# Patient Record
Sex: FEMALE | Race: WHITE | Employment: FULL TIME
[De-identification: names, ages, dates, MRNs, and addresses within clinical notes are randomized per-mention and may not be internally consistent; named-entity substitution may affect disease eponyms.]

---

## 2018-04-04 ENCOUNTER — HOSPITAL ENCOUNTER (OUTPATIENT)
Dept: MAMMOGRAPHY | Facility: CLINIC | Age: 63
Discharge: HOME | End: 2018-04-06
Payer: COMMERCIAL

## 2018-04-04 DIAGNOSIS — Z12.39 SCREENING BREAST EXAMINATION: ICD-10-CM

## 2018-04-04 PROCEDURE — 77067 SCR MAMMO BI INCL CAD: CPT

## 2018-11-26 ENCOUNTER — HOSPITAL ENCOUNTER (OUTPATIENT)
Age: 63
Discharge: HOME | End: 2018-11-26
Payer: COMMERCIAL

## 2018-11-26 LAB
ALT SERPL-CCNC: 25 U/L (ref 5–33)
ANION GAP SERPL CALCULATED.3IONS-SCNC: 11 MMOL/L (ref 9–17)
AST SERPL-CCNC: 27 U/L
BUN BLDV-MCNC: 17 MG/DL (ref 8–23)
BUN/CREAT BLD: NORMAL (ref 9–20)
CALCIUM SERPL-MCNC: 9.3 MG/DL (ref 8.6–10.4)
CHLORIDE BLD-SCNC: 102 MMOL/L (ref 98–107)
CHOLESTEROL/HDL RATIO: 3.7
CHOLESTEROL: 158 MG/DL
CO2: 28 MMOL/L (ref 20–31)
CREAT SERPL-MCNC: 0.54 MG/DL (ref 0.5–0.9)
GFR AFRICAN AMERICAN: >60 ML/MIN
GFR NON-AFRICAN AMERICAN: >60 ML/MIN
GFR SERPL CREATININE-BSD FRML MDRD: NORMAL ML/MIN/{1.73_M2}
GFR SERPL CREATININE-BSD FRML MDRD: NORMAL ML/MIN/{1.73_M2}
GLUCOSE BLD-MCNC: 96 MG/DL (ref 70–99)
HDLC SERPL-MCNC: 43 MG/DL
LDL CHOLESTEROL: 104 MG/DL (ref 0–130)
POTASSIUM SERPL-SCNC: 3.9 MMOL/L (ref 3.7–5.3)
SODIUM BLD-SCNC: 141 MMOL/L (ref 135–144)
TRIGL SERPL-MCNC: 56 MG/DL
VLDLC SERPL CALC-MCNC: NORMAL MG/DL (ref 1–30)

## 2018-11-26 PROCEDURE — 80048 BASIC METABOLIC PNL TOTAL CA: CPT

## 2018-11-26 PROCEDURE — 80061 LIPID PANEL: CPT

## 2018-11-26 PROCEDURE — 84460 ALANINE AMINO (ALT) (SGPT): CPT

## 2018-11-26 PROCEDURE — 84450 TRANSFERASE (AST) (SGOT): CPT

## 2018-11-26 PROCEDURE — 36415 COLL VENOUS BLD VENIPUNCTURE: CPT

## 2020-08-25 ENCOUNTER — HOSPITAL ENCOUNTER (OUTPATIENT)
Facility: CLINIC | Age: 65
Discharge: HOME | End: 2020-08-27
Payer: COMMERCIAL

## 2020-08-25 ENCOUNTER — HOSPITAL ENCOUNTER (OUTPATIENT)
Dept: GENERAL RADIOLOGY | Facility: CLINIC | Age: 65
Discharge: HOME | End: 2020-08-27
Payer: COMMERCIAL

## 2020-08-25 PROCEDURE — 72040 X-RAY EXAM NECK SPINE 2-3 VW: CPT

## 2020-09-01 ENCOUNTER — HOSPITAL ENCOUNTER (OUTPATIENT)
Dept: PHYSICAL THERAPY | Age: 65
Setting detail: THERAPIES SERIES
Discharge: HOME | End: 2020-09-01
Payer: COMMERCIAL

## 2020-09-01 PROCEDURE — 97161 PT EVAL LOW COMPLEX 20 MIN: CPT

## 2020-09-01 PROCEDURE — 97140 MANUAL THERAPY 1/> REGIONS: CPT

## 2020-09-01 NOTE — CONSULTS
Cane [] Quad cane [] Straight Cane [] Quad cane    [] Standard walker [] Rolling walker   [] 4 wheeled walker [] Standard walker [] Rolling walker   [] 4 wheeled walker    [] Wheelchair [] Wheelchair     Pain:  [x] Yes  [] No Location: Neck up to head   Pain Rating: (0-10 scale) 3-5/10  Pain altered Tx:  [] Yes  [x] No  Action:    Symptoms:  [] Improving [] Worsening [] Same  Better:  [] AM    [] PM    [] Sit    [] Rise/Sit    []Stand    [] Walk    [] Lying    [x] Other: hot shower, ibuprofen  Worse: [] AM    [] PM    [] Sit    [] Rise/Sit    []Stand    [] Walk    [] Lying    [] Bend                      [] Valsalva    [x] Other: random things  Sleep: [] OK    [x] Disturbed    Objective:      STRENGTH  ROM    Left Right Cervical    C5 Shld Abd 4 4 Flexion 47   Shld Flexion 4 4 Extension 32   Shld IR 4 4 Rotation L 37 R 47   Shld ER 4 4 Sidebend L 25 R 20   C6 Elb Flex 4 4 Retraction    C7 Elb Ext 4 4 Lumbar    C8 EPL   Flexion    T1 Fing Abd   Extension       Rotation L  R   Shoulder extension 4- 4- Sidebend L R   Scapular retraction 4- 4- UE/LE        B UE L WFL R WFL     TESTS (+/-) LEFT RIGHT Not Tested   Cerv. Comp - - []   Cerv. Distraction - - []   Jagdeep Tests ? Pain ?  Pain No Change Not Tested   Rep Prot [] [] [x] []   Rep Retract [] [] [x] []     OBSERVATION No Deficit Deficit Not Tested Comments   Posture       Forward Head [] [x] []    Rounded Shoulders [] [x] []    Kyphosis [] [x] [] Slight   Lordosis [x] [] []    Lateral Shift [] [] [x]    Scoliosis [] [] [x]    Iliac Crest [] [] [x]    PSIS [] [] [x]    ASIS [] [] [x]    Genu Valgus [] [] [x]    Genu Varus [] [] [x]    Genu Recurvatum [] [] [x]    Pronation [] [] [x]    Supination [] [] [x]    Leg Length Discrp [] [] [x]    Slumped Sitting [] [x] []    Palpation [] [x] [] Muscle spasms throughout bilateral upper trap, cervical paraspinals, thoracic paraspinals, and periscapular areas bilaterally   Sensation [x] [] []    Edema [x] [] [] Neurological [x] [] []      Functional Test: NDI Score: 26% functionally impaired     Comments:    Assessment:  Patient would benefit from skilled physical therapy services in order to: improve posture, decrease spasms, decrease neck pain, improve bilateral upper extremity strength, improve flexibility of anterior chest musculature. Problems:    [x] ? Pain:  [x] ? ROM:  [x] ? Strength:  [x] ? Function:  [x] Other:  Goals to not be woken up at night, and better motion while driving. STG: (to be met in 6 treatments)  1. ? Pain: Neck pain improve to 3/10 at max with all activities  2. Patient to be more aware of posture and report improved sitting/standing while doing work-related computer tasks  3. ? ROM: Cervical extension improve to 40 degrees, left rotation improve to 45 degrees  4. ? Strength: Posterior musculature improve to 4/5  5. ? Function: Patient to report better range in her cervical spine while driving  6. Patient to report no pain in clavicle when donning seat belt. 7. Patient to be independent with home exercise program as demonstrated by performance with correct form without cues. LTG: (to be met in 12 treatments)  1. Patient to report resolution of neck pain  2. Patient to report no radiating pain up her head. 3. Patient to report ability to sleep through the night without being woken up due to neck pain. 4. NDI score improve to less than 10% functionally impaired  5. Cervical ROM improve to extension 45 degrees, right side bend 25 degrees    Patient goals:  \"Lessen the pain\"    Rehab Potential:  [x] Good  [x] Fair  [] Poor   Suggested Professional Referral:  [x] No  [] Yes:  Barriers to Goal Achievement:  [] No  [x] Yes: flexed posture   Domestic Concerns:  [x] No  [] Yes:    Pt. Education:  [x] Plans/Goals, Risks/Benefits discussed  [x] Home exercise program  Method of Education: [x] Verbal  [x] Demo  [x] Written -- see chart below  Comprehension of Education:  [x] Radha (Personal factors, comorbidities) [] 0 [x] 1-2 [] 3+   Exam (limitations, restrictions) [] 1-2 [x] 3 [] 4+   Clinical presentation (progression) [x] Stable [] Evolving  [] Unstable   Decision Making [x] Low [] Moderate [] High    [x] Low Complexity [] Moderate Complexity [] High Complexity       Treatment Charges: Mins Units   [x] Evaluation       [x]  Low       []  Moderate       []  High 15 1   []  Modalities     [x]  Ther Exercise 4 0   [x]  Manual Therapy 15 1   []  Ther Activities     []  Aquatics     []  Vasocompression     []  Other       TOTAL TREATMENT TIME: 34    Time in: 2250      Time out: 1341    Electronically signed by: Beronica Mas PT        Physician Signature:________________________________Date:__________________  By signing above or cosigning this note, I have reviewed this plan of care and certify a need for medically necessary rehabilitation services.      *PLEASE SIGN ABOVE AND FAX BACK ALL PAGES*

## 2020-09-04 ENCOUNTER — HOSPITAL ENCOUNTER (OUTPATIENT)
Dept: PHYSICAL THERAPY | Age: 65
Setting detail: THERAPIES SERIES
Discharge: HOME | End: 2020-09-04
Payer: COMMERCIAL

## 2020-09-04 PROCEDURE — 97110 THERAPEUTIC EXERCISES: CPT

## 2020-09-04 PROCEDURE — G0283 ELEC STIM OTHER THAN WOUND: HCPCS

## 2020-09-04 PROCEDURE — 97140 MANUAL THERAPY 1/> REGIONS: CPT

## 2020-09-04 NOTE — FLOWSHEET NOTE
Continue current frequency toward long and short term goals. [x] Specific Instructions for subsequent treatments: Continue manual therapy for trigger point release. Stretch anterior chest.  Strengthen posterior musculature with resistance bands and prone exercises. UBE in retro only. Corner stretch or doorway stretch. Continue stretch over towel roll or half foam roll. Can add heat/stim as needed for pain control.   May consider starting Rx with modalities to relax muscles prior to stretching      Time In:12:55p            Time Out: 1:55p    Electronically signed by:  Jet Spann PTA

## 2020-09-04 NOTE — FLOWSHEET NOTE
[facility] Fall Risk Assessment    Patient Name:  Callie Mooney  : 1955        Risk Factor Scale  Score   History of Falls [] Yes  [x] No 25  0 0   Secondary Diagnosis [] Yes  [x] No 15  0 0   Ambulatory Aid [] Furniture  [] Crutches/cane/walker  [x] None/bedrest/wheelchair/nurse 30  15  0 0   IV/Heparin Lock [] Yes  [x] No 20  0 0   Gait/Transferring [] Impaired  [] Weak  [x] Normal/bedrest/immobile 20  10  0 0   Mental Status [] Forgets limitations  [x] Oriented to own ability 15  0 0      Total: 0     Based on the Assessment score: check the appropriate box.     [x]  No intervention needed   Low =   Score of 0-24    []  Use standard prevention interventions Moderate =  Score of 24-44   [] Give patient handout and discuss fall prevention strategies   [] Establish goal of education for patient/family RE: fall prevention strategies    []  Use high risk prevention interventions High = Score of 45 and higher   [] Give patient handout and discuss fall prevention strategies   [] Establish goal of education for patient/family Re: fall prevention strategies   [] Discuss lifeline / other resources    Electronically signed by:   Eugune Holter, PT  Date: 9/3/2020

## 2020-09-08 ENCOUNTER — HOSPITAL ENCOUNTER (OUTPATIENT)
Dept: PHYSICAL THERAPY | Age: 65
Setting detail: THERAPIES SERIES
Discharge: HOME | End: 2020-09-08
Payer: COMMERCIAL

## 2020-09-08 PROCEDURE — 97110 THERAPEUTIC EXERCISES: CPT

## 2020-09-08 PROCEDURE — 97140 MANUAL THERAPY 1/> REGIONS: CPT

## 2020-09-08 PROCEDURE — G0283 ELEC STIM OTHER THAN WOUND: HCPCS

## 2020-09-08 NOTE — FLOWSHEET NOTE
[x] [phone]  - [facility] TWELVE-STEP [facility] &  Therapy  [address]  [phone]  F: [phone] [] [address]  [facility] [address]   [address]  P: [phone]  F: [phone] [] [address]  [address] [facility]  P: [phone]  F: [phone] [] [address]  [facility]   [address]   [place]: [phone]  F: [phone]      Physical Therapy Daily Treatment Note    Date:  2020  Patient Name:  Ramy Newman    :  1955  MRN: [id number]  Date:  2020  Patient: Ramy Newman                       : 1955                    MRN: [id number]  Physician: Dr. Laura Childs: [facility], Based on Medical Necessity             Medical Diagnosis: Cervical spine, OA. G 72.9                    Rehab Codes: M 54.2, M 62.838, M 62.81, R 29.3  Onset Date: 20                 Next 's appt.: Not scheduled. Visit# / total visits: 3     Cancels/No Shows: 0/0    Subjective:    Pain:  [x] Yes  [] No Location: L upper trap/neck Pain Rating: (0-10 scale) 3.5/10  Pain altered Tx:  [x] No  [] Yes  Action:  Comments:Patient reports today has been a crazy day. Her pain overall is getting better- 3.5/10 today. Objective:  Modalities: Cervical HP/UES Opiate protocol x 15 min in supine with LE's elevated after ex.   Precautions:  Exercises:  Exercise Reps/ Time Weight/ Level Comments   UBE 4 min L2 Retro only         Corner stretch 3 x 10 sec                Supine      Cervical retraction 10x 3 sec hold HEP  Needed review this date for technique   Over half foam roll 1 min 1 x          Prone       Scap retraction 15 x     Rows 15 x     I's, T's, Y's 15 x ea           Standing      Shoulder rolls 10x 1 lb    Abduction to 90 degrees 15 x 1 lb    ER/IR stretch with towel 3 x ea 3 sec hold Behind back/overhead. Manual therapy  15 min  Trigger point release to bilateral upper traps, periscapular areas, CPROM, stretching to bilateral shoulder during trigger point release         Other:       Treatment Charges: Mins Units   [x]  Modalities HP/ES 15/15 0/1   [x]  Ther Exercise 30 2   [x]  Manual Therapy 15 1   []  Ther Activities     []  Aquatics     []  Vasocompression     []  Other     Total Treatment time 60        Assessment: [x] Progressing toward goals. Added exercises, reps, and weights. Tolerated all well without c/o pain. Muscle spasms more prominent in the left periscapular and upper trap versus left. Ended with modalities to decrease pain. Stated she felt more stiff after therapy. Encouraged patient to drink more water after Rx.    [] No change. [] Other:     [x] Patient would continue to benefit from skilled physical therapy services in order to improve posture, decrease spasms, decrease neck pain, improve bilateral upper extremity strength, improve flexibility of anterior chest musculature. STG: (to be met in 6 treatments)  1. ? Pain: Neck pain improve to 3/10 at max with all activities  2. Patient to be more aware of posture and report improved sitting/standing while doing work-related computer tasks  3. ? ROM: Cervical extension improve to 40 degrees, left rotation improve to 45 degrees  4. ? Strength: Posterior musculature improve to 4/5  5. ? Function: Patient to report better range in her cervical spine while driving  6. Patient to report no pain in clavicle when donning seat belt. 7. Patient to be independent with home exercise program as demonstrated by performance with correct form without cues.     LTG: (to be met in 12 treatments)  1. Patient to report resolution of neck pain  2. Patient to report no radiating pain up her head. 3. Patient to report ability to sleep through the night without being woken up due to neck pain.   4. NDI score improve to less than 10% functionally impaired  5. Cervical ROM improve to extension 45 degrees, right side bend 25 degrees     Patient goals: \"Lessen the pain\"    Pt. Education:  [x] Yes  [] No  [x] Reviewed Prior HEP/Ed  Method of Education: [x] Verbal  [x] Demo  [] Written  Comprehension of Education:  [x] Verbalizes understanding. [x] Demonstrates understanding. [x] Needs review. [] Demonstrates/verbalizes HEP/Ed previously given. Plan: [x] Continue current frequency toward long and short term goals. [x] Specific Instructions for subsequent treatments: Continue manual therapy for trigger point release. Stretch anterior chest.  Strengthen posterior musculature with resistance bands and prone exercises. Doorway stretch. Continue stretch over towel roll or half foam roll. Can add heat/stim as needed for pain control.   May consider starting Rx with modalities to relax muscles prior to stretching      Time In: 1505            Time Out: 1607    Electronically signed by:  Eugune Holter, PT

## 2020-09-10 ENCOUNTER — HOSPITAL ENCOUNTER (OUTPATIENT)
Dept: PHYSICAL THERAPY | Age: 65
Setting detail: THERAPIES SERIES
Discharge: HOME | End: 2020-09-10
Payer: COMMERCIAL

## 2020-09-10 PROCEDURE — 97140 MANUAL THERAPY 1/> REGIONS: CPT

## 2020-09-10 PROCEDURE — G0283 ELEC STIM OTHER THAN WOUND: HCPCS

## 2020-09-10 PROCEDURE — 97110 THERAPEUTIC EXERCISES: CPT

## 2020-09-10 NOTE — FLOWSHEET NOTE
[x] [facility] &  Therapy  [address]  [phone]  F: [phone] [] [address]  [place] [address]   [address]  P: [phone]  F: [phone] [] [place]  [address] [facility] [address]  P: [phone]  F: [phone] [] [address]  [facility]   [address]   [place]: [phone]  F: [phone]      Physical Therapy Daily Treatment Note    Date:  9/10/2020  Patient Name:  Troy Dominguez    :  1955  MRN: [id number]  Date:  2020  Patient: Troy Dominguez                       : 1955                    MRN: [id number]  Physician: Dr. Marissa Yoder: [facility] [place], Based on Medical Necessity             Medical Diagnosis: Cervical spine, OA. G 72.9                    Rehab Codes: M 54.2, M 62.838, M 62.81, R 29.3  Onset Date: 20                 Next 's appt.: Not scheduled. Visit# / total visits:      Cancels/No Shows: 0/0    Subjective:    Pain:  [x] Yes  [] No Location: L upper trap/neck Pain Rating: (0-10 scale) 4/10  Pain altered Tx:  [x] No  [] Yes  Action:  Comments:Patient reported that she has been very busy today. Left side is bothering her more today     Objective:  Modalities: Cervical HP/UES Opiate protocol x 15 min in supine with LE's elevated after ex.   Precautions:  Exercises:  Exercise Reps/ Time Weight/ Level Comments   UBE 4 min L2 Retro only         Corner stretch 3 x 10 sec                Supine      Cervical retraction 10x 3 sec hold HEP  Needed review this date for technique   Over half foam roll 1 min 1 x          Prone       Scap retraction 15 x     Rows 15 x     I's, T's, Y's 15 x ea           Standing      Shoulder rolls 10x 1 lb    Abduction to 90 degrees 15 x 1 lb    ER/IR stretch with towel 3 x ea 3 sec hold Behind back/overhead. Manual therapy  15 min  Trigger point release to bilateral upper traps, periscapular areas, CPROM, stretching to bilateral shoulder during trigger point release         Other:       Treatment Charges: Mins Units   [x]  Modalities HP/ES 15/15 0/1   [x]  Ther Exercise 23 2   [x]  Manual Therapy 15 1   []  Ther Activities     []  Aquatics     []  Vasocompression     []  Other     Total Treatment time 53 4       Assessment: [x] Progressing toward goals. Pt continued with activities listed above with no additional pain noted. Pt received manual therapy as listed above with concentration on periscapular and upper trap trigger point release. Pt received HP/ES at the end of treatment to decrease muscle tension and pain level. [] No change. [] Other:     [x] Patient would continue to benefit from skilled physical therapy services in order to improve posture, decrease spasms, decrease neck pain, improve bilateral upper extremity strength, improve flexibility of anterior chest musculature. STG: (to be met in 6 treatments)  1. ? Pain: Neck pain improve to 3/10 at max with all activities  2. Patient to be more aware of posture and report improved sitting/standing while doing work-related computer tasks  3. ? ROM: Cervical extension improve to 40 degrees, left rotation improve to 45 degrees  4. ? Strength: Posterior musculature improve to 4/5  5. ? Function: Patient to report better range in her cervical spine while driving  6. Patient to report no pain in clavicle when donning seat belt. 7. Patient to be independent with home exercise program as demonstrated by performance with correct form without cues.     LTG: (to be met in 12 treatments)  1. Patient to report resolution of neck pain  2. Patient to report no radiating pain up her head. 3. Patient to report ability to sleep through the night without being woken up due to neck pain.   4. NDI score improve to less than 10% functionally impaired  5. Cervical ROM improve to extension 45 degrees, right side bend 25 degrees     Patient goals: \"Lessen the pain\"    Pt. Education:  [x] Yes  [] No  [x] Reviewed Prior HEP/Ed  Method of Education: [x] Verbal  [x] Demo  [] Written  Comprehension of Education:  [x] Verbalizes understanding. [x] Demonstrates understanding. [x] Needs review. [] Demonstrates/verbalizes HEP/Ed previously given. Plan: [x] Continue current frequency toward long and short term goals. [x] Specific Instructions for subsequent treatments: Continue manual therapy for trigger point release. Stretch anterior chest.  Strengthen posterior musculature with resistance bands and prone exercises.  May consider starting Rx with modalities to relax muscles prior to stretching      Time In: 1403            Time Out: 1500    Electronically signed by:  Kary Mederos PTA

## 2020-09-11 ENCOUNTER — APPOINTMENT (OUTPATIENT)
Dept: PHYSICAL THERAPY | Age: 65
End: 2020-09-11
Payer: COMMERCIAL

## 2020-09-15 ENCOUNTER — HOSPITAL ENCOUNTER (OUTPATIENT)
Dept: PHYSICAL THERAPY | Age: 65
Setting detail: THERAPIES SERIES
Discharge: HOME | End: 2020-09-15
Payer: COMMERCIAL

## 2020-09-15 PROCEDURE — G0283 ELEC STIM OTHER THAN WOUND: HCPCS

## 2020-09-15 PROCEDURE — 97140 MANUAL THERAPY 1/> REGIONS: CPT

## 2020-09-15 PROCEDURE — 97110 THERAPEUTIC EXERCISES: CPT

## 2020-09-15 NOTE — FLOWSHEET NOTE
[x] [facility] &  Therapy  [address]  [phone]  F: [phone] [] [address]  [facility]   [address]  P: [phone]  F: [phone] [] [address]  [address] [facility]  P: [phone]  F: [phone] [] [address]  [facility]   [address]   [place]: [phone]  F: [phone]      Physical Therapy Daily Treatment Note    Date:  9/15/2020  Patient Name:  Ramy Newman    :  1955  MRN: [id number]  Date:  2020  Patient: Ramy Newman                       : 1955                    MRN: [id number]  Physician: Dr. Laura Childs: [facility] [place], Based on Medical Necessity             Medical Diagnosis: Cervical spine, OA. G 72.9                    Rehab Codes: M 54.2, M 62.838, M 62.81, R 29.3  Onset Date: 20                 Next 's appt.: Not scheduled. Visit# / total visits:      Cancels/No Shows: 0/0    Subjective:    Pain:  [x] Yes  [] No Location: L upper trap/neck   Pain Rating: (0-10 scale) 4/10  Pain altered Tx:  [x] No  [] Yes  Action:  Comments:  States she started her day with Advil. Confused on what type of pillow to use. Would prefer to sleep supine. Discussed keeping spine neutral with pillows. Objective:  Modalities: Cervical HP/UES Opiate protocol x 15 min in supine with LE's elevated after ex.   Precautions:  Exercises:  Exercise Reps/ Time Weight/ Level Comments   UBE 6 min L 3.0 Retro only         Corner stretch 3 x 10 sec                Supine      Cervical retraction 15 x 3 sec hold HEP  Needed review this date for technique   Over half foam roll 1 min 1 x          Prone       Scap retraction      Rows 15 x 1 lb    I's, T's, Y's 15 x ea 1 lb    Extension 15 x 1 lb          Standing Shoulder rolls 15 x 1 lb Retro    Abduction to 90 degrees 15 x 1 lb    ER/IR stretch with towel 3 x ea 3 sec hold Behind back/overhead. Reverse push ups 10 x           Resistance band      Lat pull down 12 x [place]    Overhead row 12 x [place]    Extension 12 x [place]    Rows 12 x [place]    Horizontal abduction  12 x [place]    D2            Manual therapy  14 min  Trigger point release to bilateral upper traps, periscapular areas, CPROM, stretching to bilateral shoulder during trigger point release          Other:       Treatment Charges: Mins Units   [x]  Modalities HP/ES 15/15 0/1   [x]  Ther Exercise 27 2   [x]  Manual Therapy 14 1   []  Ther Activities     []  Aquatics     []  Vasocompression     []  Other     Total Treatment time 56 4       Assessment: [x] Progressing toward goals. Addition of exercises as noted above with fair to good tolerance for all. Cues required for how to complete exercises, but no c/o increased pain during or after. Noted decreased time for spasms to release this date. [] No change. [] Other:     [x] Patient would continue to benefit from skilled physical therapy services in order to improve posture, decrease spasms, decrease neck pain, improve bilateral upper extremity strength, improve flexibility of anterior chest musculature. STG: (to be met in 6 treatments)  1. ? Pain: Neck pain improve to 3/10 at max with all activities  2. Patient to be more aware of posture and report improved sitting/standing while doing work-related computer tasks  3. ? ROM: Cervical extension improve to 40 degrees, left rotation improve to 45 degrees  4. ? Strength: Posterior musculature improve to 4/5  5. ? Function: Patient to report better range in her cervical spine while driving  6. Patient to report no pain in clavicle when donning seat belt.   7. Patient to be independent with home exercise program as demonstrated by performance with correct form without cues.     LTG: (to be met in 12 treatments)  1. Patient to report resolution of neck pain  2. Patient to report no radiating pain up her head. 3. Patient to report ability to sleep through the night without being woken up due to neck pain. 4. NDI score improve to less than 10% functionally impaired  5. Cervical ROM improve to extension 45 degrees, right side bend 25 degrees     Patient goals: \"Lessen the pain\"    Pt. Education:  [x] Yes  [] No  [x] Reviewed Prior HEP/Ed  Method of Education: [x] Verbal  [x] Demo  [] Written  Comprehension of Education:  [x] Verbalizes understanding. [x] Demonstrates understanding. [x] Needs review. [] Demonstrates/verbalizes HEP/Ed previously given. Plan: [x] Continue current frequency toward long and short term goals. [x] Specific Instructions for subsequent treatments: Continue manual therapy for trigger point release. Stretch anterior chest.  Strengthen posterior musculature with resistance bands and prone exercises.  May consider starting Rx with modalities to relax muscles prior to stretching      Time In: 1432            Time Out: [address] [place] [address] Dr    Electronically signed by:  Deann Foley PT

## 2020-09-17 ENCOUNTER — HOSPITAL ENCOUNTER (OUTPATIENT)
Dept: PHYSICAL THERAPY | Age: 65
Setting detail: THERAPIES SERIES
Discharge: HOME | End: 2020-09-17
Payer: COMMERCIAL

## 2020-09-17 PROCEDURE — G0283 ELEC STIM OTHER THAN WOUND: HCPCS

## 2020-09-17 PROCEDURE — 97110 THERAPEUTIC EXERCISES: CPT

## 2020-09-17 NOTE — FLOWSHEET NOTE
[x] [address]  - [facility] TWELVE-STEP [facility] CENTER &  Therapy  [address]  [phone]  F: [phone] [] [address]  [place] [address]   [address]  P: [phone]  F: [phone] [] [place]  [address] [facility]  P: [phone]  F: [phone] [] [address]  [place]   [address]   [place]: [phone]  F: [phone]      Physical Therapy Daily Treatment Note    Date:  2020  Patient Name:  Troy Dominguez    :  1955  MRN: [id number]  Date:  2020  Patient: Troy Dominguez                       : 1955                    MRN: [id number]  Physician: Dr. Marissa Yoder: [facility], Based on Medical Necessity             Medical Diagnosis: Cervical spine, OA. G 72.9                    Rehab Codes: M 54.2, M 62.838, M 62.81, R 29.3  Onset Date: 20                 Next 's appt.: Not scheduled. Visit# / total visits:      Cancels/No Shows: 0/0    Subjective:    Pain:  [x] Yes  [] No Location: L upper trap/neck   Pain Rating: (0-10 scale) 4/10  Pain altered Tx:  [x] No  [] Yes  Action:  Comments:  Pt reported that she had to take an advil this morning. Pt has had pain at 7-8/10 in the morning. Objective:  Modalities: Cervical HP/UES Opiate protocol x 15 min in supine with LE's elevated after ex.   Precautions:  Exercises:  Exercise Reps/ Time Weight/ Level Comments   UBE 2 min L 3.0 Retro only (Normally 6 min)         Corner stretch 3 x 10 sec                Supine      Cervical retraction 15 x 3 sec hold HEP  Needed review this date for technique   Over half foam roll 1 min 1 x          Prone       Scap retraction      Rows 15 x 1 lb    I's, T's, Y's 15 x ea 1 lb    Extension 15 x 1 lb          Standing      Shoulder rolls 15 x 1 lb Retro Abduction to 90 degrees 15 x 1 lb    ER/IR stretch with towel 3 x ea 3 sec hold Behind back/overhead. Reverse push ups 10 x           Resistance band      Lat pull down 12 x Blueberry    Overhead row 12 x Blueberry    Extension 12 x Blueberry    Rows 12 x Blueberry    Horizontal abduction  12 x Blueberry    D2 12x Blueberry          Manual therapy  14 min Held due to time constraint Trigger point release to bilateral upper traps, periscapular areas, CPROM, stretching to bilateral shoulder during trigger point release          Other:       Treatment Charges: Mins Units   [x]  Modalities HP/ES 15/15 0/1   [x]  Ther Exercise 24 2   [x]  Manual Therapy     []  Ther Activities     []  Aquatics     []  Vasocompression     []  Other     Total Treatment time 39 4       Assessment: [x] Progressing toward goals. Pt completed activities as noted above with no pain noted through out. Held manual therapy and supine activities this date due to patient time constraints. Added D2 to increase strength in the upper extremities. Pt received HP/Estim to end session in an attempt to decrease pain and muscle tightness. [] No change. [] Other:     [x] Patient would continue to benefit from skilled physical therapy services in order to improve posture, decrease spasms, decrease neck pain, improve bilateral upper extremity strength, improve flexibility of anterior chest musculature. STG: (to be met in 6 treatments)  1. ? Pain: Neck pain improve to 3/10 at max with all activities  2. Patient to be more aware of posture and report improved sitting/standing while doing work-related computer tasks  3. ? ROM: Cervical extension improve to 40 degrees, left rotation improve to 45 degrees  4. ? Strength: Posterior musculature improve to 4/5  5. ? Function: Patient to report better range in her cervical spine while driving  6. Patient to report no pain in clavicle when donning seat belt.   7. Patient to be independent with home exercise program as demonstrated by performance with correct form without cues.     LTG: (to be met in 12 treatments)  1. Patient to report resolution of neck pain  2. Patient to report no radiating pain up her head. 3. Patient to report ability to sleep through the night without being woken up due to neck pain. 4. NDI score improve to less than 10% functionally impaired  5. Cervical ROM improve to extension 45 degrees, right side bend 25 degrees     Patient goals: \"Lessen the pain\"    Pt. Education:  [x] Yes  [] No  [x] Reviewed Prior HEP/Ed  Method of Education: [x] Verbal  [x] Demo  [] Written  Comprehension of Education:  [x] Verbalizes understanding. [x] Demonstrates understanding. [x] Needs review. [] Demonstrates/verbalizes HEP/Ed previously given. Plan: [x] Continue current frequency toward long and short term goals. [x] Specific Instructions for subsequent treatments: Continue manual therapy for trigger point release. Stretch anterior chest.  Strengthen posterior musculature with resistance bands and prone exercises.  May consider starting Rx with modalities to relax muscles prior to stretching      Time In: 4285            Time Out: 1445    Electronically signed by:  Tigist Ortiz PTA

## 2020-09-22 ENCOUNTER — HOSPITAL ENCOUNTER (OUTPATIENT)
Dept: PHYSICAL THERAPY | Age: 65
Setting detail: THERAPIES SERIES
Discharge: HOME | End: 2020-09-22
Payer: COMMERCIAL

## 2020-09-22 PROCEDURE — 97110 THERAPEUTIC EXERCISES: CPT

## 2020-09-22 PROCEDURE — 97140 MANUAL THERAPY 1/> REGIONS: CPT

## 2020-09-22 PROCEDURE — G0283 ELEC STIM OTHER THAN WOUND: HCPCS

## 2020-09-22 NOTE — FLOWSHEET NOTE
ea 2 lb    Extension 15 x 2 lb          Standing      Shoulder rolls  1 lb Retro    Flexion to 90 degrees 15 x 2 lb    Scaption to 90 degrees 10 x  2 lb    Abduction to 90 degrees 15 x 2 lb    ER/IR stretch with towel 3 x ea 3 sec hold Behind back/overhead. Reverse push ups 15 x           Resistance band      Lat pull down 15 x Blueberry    Overhead row 15 x Blueberry    Extension 15 x Blueberry    Rows 15 x Blueberry    Horizontal abduction  15 x Blueberry    D2 10 x Blueberry    ER/IR 15 x Blueberry          Manual therapy    Trigger point release to bilateral upper traps, periscapular areas, CPROM, stretching to bilateral shoulder during trigger point release    Hypervolt 5 min  Seated, Hypervolt to bilateral upper traps, cervical paraspinals, periscapular (mid-, inferior)   Other:       Treatment Charges: Mins Units   [x]  Modalities HP/ES 8/8 0/1   [x]  Ther Exercise 35 2   [x]  Manual Therapy 5 1   []  Ther Activities     []  Aquatics     []  Vasocompression     []  Other     Total Treatment time 48        Assessment: [x] Progressing toward goals. Increased weights this date and reps. Changed to Hypervolt to see if patient felt more relief. Held manual to assess Hypervolt. Limited time on modalities as patient was on a time schedule this date. Patient did not report any increased pain after Rx. Continue. [] No change. [] Other:     [x] Patient would continue to benefit from skilled physical therapy services in order to improve posture, decrease spasms, decrease neck pain, improve bilateral upper extremity strength, improve flexibility of anterior chest musculature. STG: (to be met in 6 treatments)  1. ? Pain: Neck pain improve to 3/10 at max with all activities  2.  Patient to be more aware of posture and report improved sitting/standing while doing work-related computer tasks  3. ? ROM: Cervical extension improve to 40 degrees, left rotation improve to 45 degrees  4. ? Strength: Posterior musculature improve to 4/5  5. ? Function: Patient to report better range in her cervical spine while driving  6. Patient to report no pain in clavicle when donning seat belt. 7. Patient to be independent with home exercise program as demonstrated by performance with correct form without cues.     LTG: (to be met in 12 treatments)  1. Patient to report resolution of neck pain  2. Patient to report no radiating pain up her head. 3. Patient to report ability to sleep through the night without being woken up due to neck pain. 4. NDI score improve to less than 10% functionally impaired  5. Cervical ROM improve to extension 45 degrees, right side bend 25 degrees     Patient goals: \"Lessen the pain\"    Pt. Education:  [x] Yes  [] No  [x] Reviewed Prior HEP/Ed  Method of Education: [x] Verbal  [x] Demo  [] Written  Comprehension of Education:  [x] Verbalizes understanding. [x] Demonstrates understanding. [x] Needs review. [] Demonstrates/verbalizes HEP/Ed previously given. Plan: [x] Continue current frequency toward long and short term goals. [x] Specific Instructions for subsequent treatments: Continue manual therapy for trigger point release. Stretch anterior chest.  Strengthen posterior musculature with resistance bands and prone exercises.  May consider starting Rx with modalities to relax muscles prior to stretching      Time In: 1418            Time Out: 1511    Electronically signed by:  Bhumi Coleman PT

## 2020-09-25 ENCOUNTER — HOSPITAL ENCOUNTER (OUTPATIENT)
Dept: PHYSICAL THERAPY | Age: 65
Setting detail: THERAPIES SERIES
Discharge: HOME | End: 2020-09-25
Payer: COMMERCIAL

## 2020-09-25 PROCEDURE — 97110 THERAPEUTIC EXERCISES: CPT

## 2020-09-25 NOTE — FLOWSHEET NOTE
15 x 2 lb HELD         Standing      Shoulder rolls 15 x 1 lb Retro    Flexion to 90 degrees 15 x 2 lb    Scaption to 90 degrees 15 x  2 lb    Abduction to 90 degrees 15 x 2 lb    ER/IR stretch with towel 3 x ea 3 sec hold Behind back/overhead. Reverse push ups 15 x           Resistance band      Lat pull down 15 x Blueberry    Overhead row 15 x Blueberry    Extension 15 x Blueberry    Rows 15 x Blueberry    Horizontal abduction  15 x Blueberry    D2 10 x Blueberry    ER/IR 15 x Blueberry          Manual therapy    Trigger point release to bilateral upper traps, periscapular areas, CPROM, stretching to bilateral shoulder during trigger point release    Hypervolt 5 min  Seated, Hypervolt to bilateral upper traps, cervical paraspinals, periscapular (mid-, inferior)   Other:       Treatment Charges: Mins Units   []  Modalities HP/ES     [x]  Ther Exercise 23 2   [x]  Manual Therapy 5 -   []  Ther Activities     []  Aquatics     []  Vasocompression     []  Other     Total Treatment time 28 2       Assessment: [x] Progressing toward goals. Initiated session with stretches to increase ms flexibility followed by exercises listed above. Limited exercises implemented this date d/t pt time restraint. Vc's and demo's throughout to ensure proper technique. Ended with hypervolt per pt preference. Pt with decreased pain post treatment. [] No change. [] Other:     [x] Patient would continue to benefit from skilled physical therapy services in order to improve posture, decrease spasms, decrease neck pain, improve bilateral upper extremity strength, improve flexibility of anterior chest musculature. STG: (to be met in 6 treatments)  1. ? Pain: Neck pain improve to 3/10 at max with all activities  2.  Patient to be more aware of posture and report improved sitting/standing while doing work-related computer tasks  3. ? ROM: Cervical extension improve to 40 degrees, left rotation improve to 45 degrees  4. ? Strength: Posterior musculature improve to 4/5  5. ? Function: Patient to report better range in her cervical spine while driving  6. Patient to report no pain in clavicle when donning seat belt. 7. Patient to be independent with home exercise program as demonstrated by performance with correct form without cues.     LTG: (to be met in 12 treatments)  1. Patient to report resolution of neck pain  2. Patient to report no radiating pain up her head. 3. Patient to report ability to sleep through the night without being woken up due to neck pain. 4. NDI score improve to less than 10% functionally impaired  5. Cervical ROM improve to extension 45 degrees, right side bend 25 degrees     Patient goals: \"Lessen the pain\"    Pt. Education:  [x] Yes  [] No  [] Reviewed Prior HEP/Ed  Method of Education: [x] Verbal  [x] Demo  [] Written  Comprehension of Education:  [x] Verbalizes understanding. [x] Demonstrates understanding. [x] Needs review. [] Demonstrates/verbalizes HEP/Ed previously given. Plan: [x] Continue current frequency toward long and short term goals. [x] Specific Instructions for subsequent treatments: Continue manual therapy for trigger point release. Stretch anterior chest.  Strengthen posterior musculature with resistance bands and prone exercises.  May consider starting Rx with modalities to relax muscles prior to stretching      Time In: 2:02 pm          Time Out: 2:30 pm    Electronically signed by:  Tamara Soto PTA

## 2020-09-29 ENCOUNTER — HOSPITAL ENCOUNTER (OUTPATIENT)
Dept: PHYSICAL THERAPY | Age: 65
Setting detail: THERAPIES SERIES
Discharge: HOME | End: 2020-09-29
Payer: COMMERCIAL

## 2020-09-29 PROCEDURE — 97140 MANUAL THERAPY 1/> REGIONS: CPT

## 2020-09-29 PROCEDURE — 97110 THERAPEUTIC EXERCISES: CPT

## 2020-09-29 NOTE — FLOWSHEET NOTE
2 lb    Extension 15 x 2 lb          Standing      Shoulder rolls 20 x 1 lb Retro    Flexion to 90 degrees 20 x 2 lb    Scaption to 90 degrees 20 x  2 lb    Abduction to 90 degrees 20 x 2 lb    ER/IR stretch with towel 3 x ea 3 sec hold Behind back/overhead. Reverse push ups 20 x           Resistance band      Lat pull down 20 x Blueberry    Overhead row 20 x Blueberry    Extension 20 x Blueberry    Rows 20 x Blueberry    Horizontal abduction  20 x Blueberry    D2   Blueberry    ER/IR 20 x Blueberry          Manual therapy    Trigger point release to bilateral upper traps, periscapular areas, CPROM, stretching to bilateral shoulder during trigger point release    Hypervolt 8 min  Seated, Hypervolt to bilateral upper traps, cervical paraspinals, periscapular (mid-, inferior)   Other:        Treatment Charges: Mins Units   []  Modalities HP/ES     [x]  Ther Exercise 35 2   [x]  Manual Therapy 8 1   []  Ther Activities     []  Aquatics     []  Vasocompression     []  Other     Total Treatment time 43        Assessment: [x] Progressing toward goals. Added reps and weight; patient continues to progress with her strengthening. She does continue to require cues for all - but states she is doing her HEP. Reassessment as noted below, with progressions in ROM, strength, and feeling over left clavicle. [] No change. [] Other:     [x] Patient would continue to benefit from skilled physical therapy services in order to improve posture, decrease spasms, decrease neck pain, improve bilateral upper extremity strength, improve flexibility of anterior chest musculature. STG: (to be met in 6 treatments)  1. ? Pain: Neck pain improve to 3/10 at max with all activities -- Report neck pain up to 7/10.  2. Patient to be more aware of posture and report improved sitting/standing while doing work-related computer tasks -- Patient reports when she is conscious of her posture, she will fix it.   3. ? ROM: Cervical extension

## 2020-10-02 ENCOUNTER — HOSPITAL ENCOUNTER (OUTPATIENT)
Dept: PHYSICAL THERAPY | Age: 65
Setting detail: THERAPIES SERIES
Discharge: HOME | End: 2020-10-02
Payer: COMMERCIAL

## 2020-10-02 NOTE — FLOWSHEET NOTE
[x] [facility]) [facility] &  Therapy  [address]    [phone]  F: [phone]   [] [address]  [facility]   [address]  P: [phone]  F: [phone]  [] [address]  [address] [facility]  P: [phone]  F: [phone]  [] [address]  [address] [facility] [address]   [address]   [place]: [phone]  F: [phone]   [] [place] [id number] [facility] [address]  [place]: [phone]   F: [phone]     Physical Therapy Cancel/No Show note    Date: 10/2/2020  Patient:  Jessie Medrano  : 1955  MRN: [id number]    Cancels/No Shows to date:     For today's appointment patient:    [x]  Cancelled  10/2/20 and 10/6/20    [] Rescheduled appointment    [] No-show     Reason given by patient:    []  Patient ill    [x]  Conflicting appointment    [] No transportation      [] Conflict with work    [] No reason given    [] Weather related    [] [id number]    [] Other:      Comments:        [] Next appointment was confirmed    Electronically signed by: Td July, PT

## 2020-10-06 ENCOUNTER — APPOINTMENT (OUTPATIENT)
Dept: PHYSICAL THERAPY | Age: 65
End: 2020-10-06
Payer: COMMERCIAL

## 2020-10-09 ENCOUNTER — HOSPITAL ENCOUNTER (OUTPATIENT)
Dept: PHYSICAL THERAPY | Age: 65
Setting detail: THERAPIES SERIES
Discharge: HOME | End: 2020-10-09
Payer: COMMERCIAL

## 2020-10-09 PROCEDURE — 97110 THERAPEUTIC EXERCISES: CPT

## 2020-10-09 PROCEDURE — 97140 MANUAL THERAPY 1/> REGIONS: CPT

## 2020-10-09 PROCEDURE — G0283 ELEC STIM OTHER THAN WOUND: HCPCS

## 2020-10-09 NOTE — FLOWSHEET NOTE
[x] [facility]) - [facility] &  Therapy  [address]  [phone]  F: [phone] [] [address]  [place] [address]   [address]  P: [phone]  F: [phone] [] [place]  [address] [facility]  P: [phone]  F: [phone] [] [address]  [facility]   [address]   [place]: [phone]  F: [phone]      Physical Therapy Daily Treatment Note    Date:  10/9/2020  Patient Name:  Belkis Friedman    :  1955  MRN: [id number]  Date:  2020  Patient: Belkis Friedman                       : 1955                    MRN: [id number]  Physician: Dr. Chaudhry Courts: [facility] [place], Based on Medical Necessity             Medical Diagnosis: Cervical spine, OA. G 72.9                    Rehab Codes: M 54.2, M 62.838, M 62.81, R 29.3  Onset Date: 20                 Next 's appt.: Not scheduled. Visit# / total visits: 10/12     Cancels/No Shows: 0/0    Subjective:    Pain:  [x] Yes  [] No Location: L upper trap/neck   Pain Rating: (0-10 scale) 4/10  Pain altered Tx:  [x] No  [] Yes  Action:  Comments:  Reports no change in pain, overall doing the same. Asking about doing chiropractic care - suggested if she does it, then to continue with strengthening exercises to keep in proper posture. Patient asking about treadmill walking - will evaluate next Rx. Objective:  Modalities: Cervical HP/UES Opiate protocol x 14 min in supine with LE's elevated after ex. --   Precautions:  Exercises:  Exercise Reps/ Time Weight/ Level Comments   UBE 6 min L 3.0 Retro only         Corner stretch 3 x 10 sec    Resume cervical stretch   Performed with Hypervolt on 10/9/20.          Supine      Cervical retraction 15 3 sec hold HEP     Over half foam roll 2 min 1 x Prone       Scap retraction      Rows 15 x 2 lb    I's, T's, Y's 15 x ea 2 lb    Extension 15 x 2 lb          Standing      Shoulder rolls 20 x 2 lb Retro    Flexion to 90 degrees 20 x 2 lb    Scaption to 90 degrees 20 x  2 lb    Abduction to 90 degrees 20 x 2 lb    ER/IR stretch with towel 3 x ea 3 sec hold Behind back/overhead. Reverse push ups 20 x           Resistance band      Lat pull down 20 x Blueberry    Overhead row 20 x Blueberry    Extension 20 x Blueberry    Rows 20 x Blueberry    Horizontal abduction  20 x Blueberry    D2   Blueberry    ER/IR 20 x Blueberry          Manual therapy    Trigger point release to bilateral upper traps, periscapular areas, CPROM, stretching to bilateral shoulder during trigger point release    Hypervolt with cervical AROM 6 min  Seated, Hypervolt to bilateral upper traps, cervical paraspinals, periscapular (mid-, inferior)   Other:        Treatment Charges: Mins Units   [x]  Modalities HP/ES 14/14 0/1   [x]  Ther Exercise 40 2   [x]  Manual Therapy 6 1   []  Ther Activities     []  Aquatics     []  Vasocompression     []  Other     Total Treatment time 60        Assessment: [x] Progressing toward goals. Resumed modalities per patient request.  Completed Hypervolt with active cervical stretches this date. Patient reported feeling better after exercises/modalities but did not give a number. Patient reported increased fatigue with standing dumbbell exercises. [] No change. [] Other:     [x] Patient would continue to benefit from skilled physical therapy services in order to improve posture, decrease spasms, decrease neck pain, improve bilateral upper extremity strength, improve flexibility of anterior chest musculature.       STG: (to be met in 6 treatments)  1. ? Pain: Neck pain improve to 3/10 at max with all activities -- Report neck pain up to 7/10.  2. Patient to be more aware of posture and report improved sitting/standing while doing work-related computer tasks -- Patient reports when she is conscious of her posture, she will fix it. 3. ? ROM: Cervical extension improve to 40 degrees, left rotation improve to 45 degrees -- Extension improved to 37 degrees, left rotation 43 degrees. 4. ? Strength: Posterior musculature improve to 4/5 -- Posterior muscular strength is 4+/5  5. ? Function: Patient to report better range in her cervical spine while driving -- Patient reports she still struggles with turning her neck. 6. Patient to report no pain in clavicle when donning seat belt. -- Reports this is better- not gone. A couple times a week. 7. Patient to be independent with home exercise program as demonstrated by performance with correct form without cues. -- Met     LTG: (to be met in 12 treatments)  1. Patient to report resolution of neck pain  2. Patient to report no radiating pain up her head. 3. Patient to report ability to sleep through the night without being woken up due to neck pain. 4. NDI score improve to less than 10% functionally impaired  5. Cervical ROM improve to extension 45 degrees, right side bend 25 degrees     Patient goals: \"Lessen the pain\"    Pt. Education:  [x] Yes  [] No  [] Reviewed Prior HEP/Ed  Method of Education: [x] Verbal  [x] Demo  [] Written -- reviewed prior exs  Comprehension of Education:  [x] Verbalizes understanding. [x] Demonstrates understanding. [x] Needs review. [] Demonstrates/verbalizes HEP/Ed previously given. Plan: [x] Continue current frequency toward long and short term goals.     [x] Specific Instructions for subsequent treatments:  May consider starting Rx with modalities to relax muscles prior to stretching      Time In: 9815          Time Out: [place]    Electronically signed by:  Braydon Vargas, PT

## 2020-10-13 ENCOUNTER — HOSPITAL ENCOUNTER (OUTPATIENT)
Dept: PHYSICAL THERAPY | Age: 65
Setting detail: THERAPIES SERIES
Discharge: HOME | End: 2020-10-13
Payer: COMMERCIAL

## 2020-10-13 PROCEDURE — G0283 ELEC STIM OTHER THAN WOUND: HCPCS

## 2020-10-13 PROCEDURE — 97530 THERAPEUTIC ACTIVITIES: CPT

## 2020-10-13 NOTE — FLOWSHEET NOTE
[x] [facility] &  Therapy  [address]  [phone]  F: [phone] [] [address]  [facility]   [address]  P: [phone]  F: [phone] [] [address]  [address] [facility]  P: [phone]  F: [phone] [] [address]  [facility]   [address]   [place]: [phone]  F: [phone]      Physical Therapy Daily Treatment Note    Date:  10/13/2020  Patient Name:  Pantera Gross    :  1955  MRN: [id number]  Date:  2020  Patient: Pantera Gross                       : 1955                    MRN: [id number]  Physician: Dr. Darrel Love: Medical [place], Based on Medical Necessity             Medical Diagnosis: Cervical spine, OA. G 72.9                    Rehab Codes: M 54.2, M 62.838, M 62.81, R 29.3  Onset Date: 20                 Next 's appt.: Not scheduled. Visit# / total visits:      Cancels/No Shows: 0/0    Subjective:    Pain:  [x] Yes  [] No Location: L upper trap/neck   Pain Rating: (0-10 scale) 5/10  Pain altered Tx:  [x] No  [] Yes  Action:  Comments:  Pain was off the chart yesterday, better today. No reason for increased pain. Still just on the left side. Objective:  Modalities: Cervical HP/UES Opiate protocol x 15 min in supine with LE's elevated after kinesiotape application  Precautions:  Exercises:  HELD ALL EXERCISES ON 10/13/20  Exercise Reps/ Time Weight/ Level Comments   UBE 6 min L 3.0 Retro only         Corner stretch 3 x 10 sec    Resume cervical stretch   Performed with Hypervolt on 10/9/20.          Supine      Cervical retraction 15 3 sec hold HEP     Over half foam roll 2 min 1 x          Prone       Scap retraction      Rows 15 x 2 lb    I's, T's, Y's 15 x ea 2 lb    Extension 15 7/10.  2. Patient to be more aware of posture and report improved sitting/standing while doing work-related computer tasks -- Patient reports when she is conscious of her posture, she will fix it. 3. ? ROM: Cervical extension improve to 40 degrees, left rotation improve to 45 degrees -- Extension improved to 37 degrees, left rotation 43 degrees. 4. ? Strength: Posterior musculature improve to 4/5 -- Posterior muscular strength is 4+/5  5. ? Function: Patient to report better range in her cervical spine while driving -- Patient reports she still struggles with turning her neck. 6. Patient to report no pain in clavicle when donning seat belt. -- Reports this is better- not gone. A couple times a week. 7. Patient to be independent with home exercise program as demonstrated by performance with correct form without cues. -- Met     LTG: (to be met in 12 treatments)  1. Patient to report resolution of neck pain  2. Patient to report no radiating pain up her head. 3. Patient to report ability to sleep through the night without being woken up due to neck pain. 4. NDI score improve to less than 10% functionally impaired  5. Cervical ROM improve to extension 45 degrees, right side bend 25 degrees     Patient goals: \"Lessen the pain\"    Pt. Education:  [x] Yes  [] No  [] Reviewed Prior HEP/Ed  Method of Education: [x] Verbal  [x] Demo  [] Written -- reviewed prior exs  Comprehension of Education:  [x] Verbalizes understanding. [x] Demonstrates understanding. [x] Needs review. [] Demonstrates/verbalizes HEP/Ed previously given. Plan: [x] Continue current frequency toward long and short term goals.     [x] Specific Instructions for subsequent treatments:  May consider starting Rx with modalities to relax muscles prior to stretching      Time In: 1405          Time Out: 1440    Electronically signed by:  Rajan Damon PT

## 2020-10-16 ENCOUNTER — HOSPITAL ENCOUNTER (OUTPATIENT)
Dept: PHYSICAL THERAPY | Age: 65
Setting detail: THERAPIES SERIES
Discharge: HOME | End: 2020-10-16
Payer: COMMERCIAL

## 2020-10-16 PROCEDURE — G0283 ELEC STIM OTHER THAN WOUND: HCPCS

## 2020-10-16 PROCEDURE — 97530 THERAPEUTIC ACTIVITIES: CPT

## 2020-10-16 NOTE — FLOWSHEET NOTE
[x] [facility] &  Therapy  [address]  [phone]  F: [phone] [] [address]  [place] [address]   [address]  P: [phone]  F: [phone] [] [place]  [address]  P: [phone]  F: [phone] [] [address]  [facility]   [address]   [place]: [phone]  F: [phone]      Physical Therapy Daily Treatment Note    Date:  10/16/2020  Patient Name:  Jessie Medrano YOB: 1955  MRN: [id number]  Date:  2020  Patient: Jessie Medrano                       : 1955                    MRN: [id number]  Physician: Dr. George Rob: Medical [place], Based on Medical Necessity             Medical Diagnosis: Cervical spine, OA. G 72.9                    Rehab Codes: M 54.2, M 62.838, M 62.81, R 29.3  Onset Date: 20                 Next 's appt.: Not scheduled. Visit# / total visits:      Cancels/No Shows: 0/0    Subjective:    Pain:  [x] Yes  [] No Location: L upper trap/neck   Pain Rating: (0-10 scale) 3/10  Pain altered Tx:  [x] No  [] Yes  Action:  Comments:  Reports feeling so much better with the tape on. Pain is still present but she feels like the pain is just at her arthritic levels, not throughout her neck like it was. Objective:  Modalities: Large HP to upper back and cervical HP/UES Opiate protocol x 15 min in supine with LE's elevated. Precautions:  Exercises:  HELD ALL EXERCISES ON 10/16/20  Exercise Reps/ Time Weight/ Level Comments   UBE 6 min L 3.0 Retro only         Corner stretch 3 x 10 sec    Resume cervical stretch   Performed with Hypervolt on 10/9/20.          Supine      Cervical retraction 15 3 sec hold HEP     Over half foam roll 2 min 1 x          Prone       Scap retraction      Rows and report improved sitting/standing while doing work-related computer tasks -- Patient reports when she is conscious of her posture, she will fix it. 3. ? ROM: Cervical extension improve to 40 degrees, left rotation improve to 45 degrees -- Extension improved to 37 degrees, left rotation 43 degrees. 4. ? Strength: Posterior musculature improve to 4/5 -- Posterior muscular strength is 4+/5  5. ? Function: Patient to report better range in her cervical spine while driving -- Patient reports she still struggles with turning her neck. 6. Patient to report no pain in clavicle when donning seat belt. -- Reports this is better- not gone. A couple times a week. 7. Patient to be independent with home exercise program as demonstrated by performance with correct form without cues. -- Met     LTG: (to be met in 12 treatments)  1. Patient to report resolution of neck pain -- Neck pain on average is 3/10; when it spikes, up to 5-6/10.  2. Patient to report no radiating pain up her head. -- This has resolved  3. Patient to report ability to sleep through the night without being woken up due to neck pain. -- Still wakes twice nightly due to neck pain. 4. NDI score improve to less than 10% functionally impaired --   5. Cervical ROM improve to extension 45 degrees, right side bend 25 degrees -- 27 degrees extension; 16 degrees right side bending.     Patient goals: \"Lessen the pain\"    Pt. Education:  [x] Yes  [] No  [] Reviewed Prior HEP/Ed  Method of Education: [x] Verbal  [x] Demo  [x] Written -- HEP given on 10/16/20: shoulder rolls, scapular retraction, prone extension, standing flexion, prone rows, prone horizontal abduction, prone flexion, standing abduction  Comprehension of Education:  [x] Verbalizes understanding. [x] Demonstrates understanding. [x] Needs review. [] Demonstrates/verbalizes HEP/Ed previously given.      Plan: [x] Discharge   [] Specific Instructions for subsequent treatments:  May consider starting Rx with modalities to relax muscles prior to stretching      Time In: 1302          Time Out: 4744    Electronically signed by:  Kp Calderon PT

## 2020-10-16 NOTE — DISCHARGE SUMMARY
[x] [facility]) - [facility] &  Therapy  [address]  [phone]  F: [phone] [] [address]  [address] [facility]   [address]  P: [phone]  F: [phone] [] [address] &  Therapy  [address] [facility]  P: [phone]  F: [phone] [] [address] [facility]  P: [phone]  F: [phone] [] [address]  [facility]   [address]   [place]: [phone]  F: [phone]      Physical Therapy Discharge Note    Date: 10/16/2020      Patient: Pat Tony  : 1955  MRN: [id number]: HGSigrid Joshua Raza  Insurance: Medical [place], Based on Medical Necessity             Medical Diagnosis: Cervical spine, OA.  G 72.9                    Rehab Codes: M 54.2, M 62.838, M 62.81, R 29.3  Onset Date: 20                 Next 's appt.: Not scheduled. Visit# / total visits:                                 Cancels/No Shows: 0/0  Date of initial visit: 20                Date of final visit: 10/16/20    Subjective:    Pain:  [x]? Yes  []? No   Location: L upper trap/neck      Pain Rating: (0-10 scale) 3/10  Pain altered Tx:  [x]? No  []? Yes  Action:  Comments:  Reports feeling so much better with the tape on. Pain is still present but she feels like the pain is just at her arthritic levels, not throughout her neck like it was.     Objective:  Test Measurements:  No change for cervical extension (27 degrees) or right side bend (16 degrees). Pain up her head has resolved  Function:  No improvement in sleep. Neck pain remains 3-5/10. NDI score of 20% functionally impaired.     Assessment:  STG: (to be met in 6 treatments)  1. ? Pain: Neck pain improve to 3/10 at max with all activities -- Report neck pain up to 7/10.  2. Patient to be more aware of posture and report improved sitting/standing while doing work-related computer tasks -- Patient reports when she is conscious of her posture, she will fix it. 3. ? ROM: Cervical extension improve to 40 degrees, left rotation improve to 45 degrees -- Extension improved to 37 degrees, left rotation 43 degrees. 4. ? Strength: Posterior musculature improve to 4/5 -- Posterior muscular strength is 4+/5  5. ? Function: Patient to report better range in her cervical spine while driving -- Patient reports she still struggles with turning her neck. 6. Patient to report no pain in clavicle when donning seat belt. -- Reports this is better- not gone. A couple times a week. 7. Patient to be independent with home exercise program as demonstrated by performance with correct form without cues. -- Met     LTG: (to be met in 12 treatments)  1. Patient to report resolution of neck pain -- Neck pain on average is 3/10; when it spikes, up to 5-6/10.  2. Patient to report no radiating pain up her head. -- This has resolved  3. Patient to report ability to sleep through the night without being woken up due to neck pain. -- Still wakes twice nightly due to neck pain. 4. NDI score improve to less than 10% functionally impaired -- NDI score of 20% functionally impaired  5.  Cervical ROM improve to extension 45 degrees, right side bend 25 degrees -- 27 degrees extension; 16 degrees right side bending.     Patient goals: \"Lessen the pain\"    Treatment to Date:  [x] Therapeutic Exercise    [] Modalities:  [x] Therapeutic Activity    [] Ultrasound  [x] Electrical Stimulation  [] Gait Training     [] Massage       [] Lumbar/Cervical Traction  [] Neuromuscular Re-education [x] Cold/hotpack [] Iontophoresis: 4 mg/mL  [x] Instruction in Home Exercise Program                     Dexamethasone Sodium  [x] Manual Therapy             Phosphate 40-80 mAmin  [] Aquatic Therapy                   [] Vasocompression/    [x] Other: kinesiotape             Game Ready    Discharge Status:     [] Pt recovered from conditions. Treatment goals were met. [x] Pt received maximum benefit. No further therapy indicated at this time. [x] Pt to continue exercise/home instructions independently. [] Therapy interrupted due to:    [] Pt has 2 or more no shows/cancels, is discontinued per our policy. [] Pt has completed prescribed number of treatment sessions. [x] Other: Patient progressed through therapy fairly. Due to postural changes, pain will remain an issue. Patient found kinesiotape to be most helpful to her pain. Electronically signed by Cesar Calvert PT on 10/16/2020 at 2:16 PM      If you have any questions or concerns, please don't hesitate to call.   Thank you for your referral.

## 2021-01-30 ENCOUNTER — HOSPITAL ENCOUNTER (OUTPATIENT)
Facility: CLINIC | Age: 66
Discharge: HOME | End: 2021-01-30
Payer: COMMERCIAL

## 2021-01-30 LAB
ANION GAP SERPL CALCULATED.3IONS-SCNC: 6 MMOL/L (ref 9–17)
BUN BLDV-MCNC: 21 MG/DL (ref 8–23)
BUN/CREAT BLD: ABNORMAL (ref 9–20)
CALCIUM SERPL-MCNC: 9.4 MG/DL (ref 8.6–10.4)
CHLORIDE BLD-SCNC: 104 MMOL/L (ref 98–107)
CHOLESTEROL, FASTING: 216 MG/DL
CHOLESTEROL/HDL RATIO: 3.2
CO2: 28 MMOL/L (ref 20–31)
CREAT SERPL-MCNC: 0.56 MG/DL (ref 0.5–0.9)
GFR AFRICAN AMERICAN: >60 ML/MIN
GFR NON-AFRICAN AMERICAN: >60 ML/MIN
GFR SERPL CREATININE-BSD FRML MDRD: ABNORMAL ML/MIN/{1.73_M2}
GFR SERPL CREATININE-BSD FRML MDRD: ABNORMAL ML/MIN/{1.73_M2}
GLUCOSE FASTING: 89 MG/DL (ref 70–99)
HCT VFR BLD CALC: 41.9 % (ref 36.3–47.1)
HDLC SERPL-MCNC: 68 MG/DL
HEMOGLOBIN: 14.1 G/DL (ref 11.9–15.1)
HIGH SENSITIVE C-REACTIVE PROTEIN: 1.2 MG/L
LDL CHOLESTEROL: 130 MG/DL (ref 0–130)
MAGNESIUM: 2.1 MG/DL (ref 1.6–2.6)
MCH RBC QN AUTO: 31.5 PG (ref 25.2–33.5)
MCHC RBC AUTO-ENTMCNC: 33.7 G/DL (ref 28.4–34.8)
MCV RBC AUTO: 93.7 FL (ref 82.6–102.9)
NRBC AUTOMATED: 0 PER 100 WBC
PDW BLD-RTO: 12.2 % (ref 11.8–14.4)
PLATELET # BLD: 212 K/UL (ref 138–453)
PMV BLD AUTO: 10.6 FL (ref 8.1–13.5)
POTASSIUM SERPL-SCNC: 4.2 MMOL/L (ref 3.7–5.3)
RBC # BLD: 4.47 M/UL (ref 3.95–5.11)
RHEUMATOID FACTOR: <10 IU/ML
SODIUM BLD-SCNC: 138 MMOL/L (ref 135–144)
TRIGLYCERIDE, FASTING: 89 MG/DL
VLDLC SERPL CALC-MCNC: ABNORMAL MG/DL (ref 1–30)
WBC # BLD: 5.7 K/UL (ref 3.5–11.3)

## 2021-01-30 PROCEDURE — 36415 COLL VENOUS BLD VENIPUNCTURE: CPT

## 2021-01-30 PROCEDURE — 86038 ANTINUCLEAR ANTIBODIES: CPT

## 2021-01-30 PROCEDURE — 85027 COMPLETE CBC AUTOMATED: CPT

## 2021-01-30 PROCEDURE — 86431 RHEUMATOID FACTOR QUANT: CPT

## 2021-01-30 PROCEDURE — 80048 BASIC METABOLIC PNL TOTAL CA: CPT

## 2021-01-30 PROCEDURE — 83735 ASSAY OF MAGNESIUM: CPT

## 2021-01-30 PROCEDURE — 80061 LIPID PANEL: CPT

## 2021-01-30 PROCEDURE — 86141 C-REACTIVE PROTEIN HS: CPT

## 2021-02-03 LAB
SEND OUT REPORT: NORMAL
TEST NAME: NORMAL

## 2021-08-31 LAB
CHOLESTEROL/HDL RATIO: 3.9
CHOLESTEROL: 216 MG/DL
GLUCOSE BLD-MCNC: 88 MG/DL (ref 70–99)
HDLC SERPL-MCNC: 56 MG/DL
LDL CHOLESTEROL: 140 MG/DL (ref 0–130)
PATIENT FASTING?: YES
TRIGL SERPL-MCNC: 99 MG/DL
VLDLC SERPL CALC-MCNC: ABNORMAL MG/DL (ref 1–30)

## 2021-12-10 ENCOUNTER — HOSPITAL ENCOUNTER (OUTPATIENT)
Dept: RESEARCH | Age: 66
Discharge: HOME | End: 2021-12-10
Payer: COMMERCIAL

## 2021-12-10 LAB
POC HEMATOCRIT: 41 % (ref 36–46)
POC HEMOGLOBIN: 14 G/DL (ref 12–16)

## 2021-12-10 PROCEDURE — 85014 HEMATOCRIT: CPT

## 2022-06-27 ENCOUNTER — HOSPITAL ENCOUNTER (OUTPATIENT)
Age: 67
Discharge: HOME | End: 2022-06-27
Payer: COMMERCIAL

## 2022-06-27 LAB
ALT SERPL-CCNC: 15 U/L (ref 5–33)
ANION GAP SERPL CALCULATED.3IONS-SCNC: 13 MMOL/L (ref 9–17)
BUN BLDV-MCNC: 20 MG/DL (ref 8–23)
C-REACTIVE PROTEIN: <3 MG/L (ref 0–5)
CALCIUM SERPL-MCNC: 9.3 MG/DL (ref 8.6–10.4)
CHLORIDE BLD-SCNC: 102 MMOL/L (ref 98–107)
CHOLESTEROL, FASTING: 219 MG/DL
CHOLESTEROL/HDL RATIO: 3.8
CO2: 25 MMOL/L (ref 20–31)
CREAT SERPL-MCNC: 0.66 MG/DL (ref 0.5–0.9)
GFR AFRICAN AMERICAN: >60 ML/MIN
GFR NON-AFRICAN AMERICAN: >60 ML/MIN
GFR SERPL CREATININE-BSD FRML MDRD: ABNORMAL ML/MIN/{1.73_M2}
GLUCOSE BLD-MCNC: 100 MG/DL (ref 70–99)
HDLC SERPL-MCNC: 57 MG/DL
LDL CHOLESTEROL: 140 MG/DL (ref 0–130)
POTASSIUM SERPL-SCNC: 4.1 MMOL/L (ref 3.7–5.3)
RHEUMATOID FACTOR: <10 IU/ML
SEDIMENTATION RATE, ERYTHROCYTE: 15 MM/HR (ref 0–30)
SODIUM BLD-SCNC: 140 MMOL/L (ref 135–144)
TRIGLYCERIDE, FASTING: 111 MG/DL

## 2022-06-27 PROCEDURE — 80048 BASIC METABOLIC PNL TOTAL CA: CPT

## 2022-06-27 PROCEDURE — 85652 RBC SED RATE AUTOMATED: CPT

## 2022-06-27 PROCEDURE — 86038 ANTINUCLEAR ANTIBODIES: CPT

## 2022-06-27 PROCEDURE — 86225 DNA ANTIBODY NATIVE: CPT

## 2022-06-27 PROCEDURE — 36415 COLL VENOUS BLD VENIPUNCTURE: CPT

## 2022-06-27 PROCEDURE — 86140 C-REACTIVE PROTEIN: CPT

## 2022-06-27 PROCEDURE — 86235 NUCLEAR ANTIGEN ANTIBODY: CPT

## 2022-06-27 PROCEDURE — 86431 RHEUMATOID FACTOR QUANT: CPT

## 2022-06-27 PROCEDURE — 84460 ALANINE AMINO (ALT) (SGPT): CPT

## 2022-06-27 PROCEDURE — 80061 LIPID PANEL: CPT

## 2022-06-28 LAB
ANTI DNA DOUBLE STRANDED: 29 IU/ML
ANTI-NUCLEAR ANTIBODY (ANA): POSITIVE
ENA ANTIBODIES SCREEN: 0.1 U/ML

## 2022-07-02 LAB
ANTI JO-1 IGG: <0.4 U/ML
ANTI SSA: 0.3 U/ML
ANTI SSB: <0.3 U/ML
ANTI-CENTROMERE: <0.4 U/ML
ANTI-RNP70: <0.3 U/ML
ANTI-SCLERODERMA: 0.9 U/ML
ANTI-SMITH: 2.5 U/ML
ANTI-U1RNP: 0.8 U/ML

## 2022-12-09 ENCOUNTER — HOSPITAL ENCOUNTER (OUTPATIENT)
Dept: MAMMOGRAPHY | Age: 67
Discharge: HOME | End: 2022-12-09
Payer: COMMERCIAL

## 2022-12-09 DIAGNOSIS — Z12.31 SCREENING MAMMOGRAM FOR BREAST CANCER: ICD-10-CM

## 2022-12-09 PROCEDURE — 77063 BREAST TOMOSYNTHESIS BI: CPT

## 2024-01-03 ENCOUNTER — HOSPITAL ENCOUNTER (OUTPATIENT)
Facility: CLINIC | Age: 69
Discharge: HOME | End: 2024-01-05
Payer: MEDICARE

## 2024-01-03 ENCOUNTER — HOSPITAL ENCOUNTER (OUTPATIENT)
Dept: GENERAL RADIOLOGY | Facility: CLINIC | Age: 69
Discharge: HOME | End: 2024-01-05
Attending: INTERNAL MEDICINE
Payer: MEDICARE

## 2024-01-03 DIAGNOSIS — M16.11 OSTEOARTHRITIS OF RIGHT HIP, UNSPECIFIED OSTEOARTHRITIS TYPE: ICD-10-CM

## 2024-01-03 PROCEDURE — 73502 X-RAY EXAM HIP UNI 2-3 VIEWS: CPT

## 2024-11-20 ENCOUNTER — HOSPITAL ENCOUNTER (OUTPATIENT)
Age: 69
Discharge: HOME | End: 2024-11-20
Payer: MEDICARE

## 2024-11-20 VITALS
WEIGHT: 201 LBS | SYSTOLIC BLOOD PRESSURE: 205 MMHG | RESPIRATION RATE: 14 BRPM | HEART RATE: 80 BPM | TEMPERATURE: 98.2 F | DIASTOLIC BLOOD PRESSURE: 86 MMHG | OXYGEN SATURATION: 99 % | HEIGHT: 64 IN | BODY MASS INDEX: 34.31 KG/M2

## 2024-11-20 DIAGNOSIS — Z01.818 PRE-OP TESTING: Primary | ICD-10-CM

## 2024-11-20 LAB
ANION GAP SERPL CALCULATED.3IONS-SCNC: 13 MMOL/L (ref 9–16)
BUN SERPL-MCNC: 18 MG/DL (ref 8–23)
CALCIUM SERPL-MCNC: 10.2 MG/DL (ref 8.6–10.4)
CHLORIDE SERPL-SCNC: 100 MMOL/L (ref 98–107)
CO2 SERPL-SCNC: 27 MMOL/L (ref 20–31)
CREAT SERPL-MCNC: 0.7 MG/DL (ref 0.6–0.9)
ERYTHROCYTE [DISTWIDTH] IN BLOOD BY AUTOMATED COUNT: 12.6 % (ref 11.8–14.4)
GFR, ESTIMATED: >90 ML/MIN/1.73M2
GLUCOSE SERPL-MCNC: 96 MG/DL (ref 74–99)
HCT VFR BLD AUTO: 43.5 % (ref 36.3–47.1)
HGB BLD-MCNC: 14.4 G/DL (ref 11.9–15.1)
MCH RBC QN AUTO: 32.6 PG (ref 25.2–33.5)
MCHC RBC AUTO-ENTMCNC: 33.1 G/DL (ref 28.4–34.8)
MCV RBC AUTO: 98.4 FL (ref 82.6–102.9)
NRBC BLD-RTO: 0 PER 100 WBC
PLATELET # BLD AUTO: 232 K/UL (ref 138–453)
PMV BLD AUTO: 10.5 FL (ref 8.1–13.5)
POTASSIUM SERPL-SCNC: 4.2 MMOL/L (ref 3.7–5.3)
RBC # BLD AUTO: 4.42 M/UL (ref 3.95–5.11)
SODIUM SERPL-SCNC: 140 MMOL/L (ref 136–145)
WBC OTHER # BLD: 10.4 K/UL (ref 3.5–11.3)

## 2024-11-20 PROCEDURE — 36415 COLL VENOUS BLD VENIPUNCTURE: CPT

## 2024-11-20 PROCEDURE — 80048 BASIC METABOLIC PNL TOTAL CA: CPT

## 2024-11-20 PROCEDURE — 85027 COMPLETE CBC AUTOMATED: CPT

## 2024-11-20 PROCEDURE — 93005 ELECTROCARDIOGRAM TRACING: CPT

## 2024-11-20 RX ORDER — LORAZEPAM 0.5 MG/1
1 TABLET ORAL 2 TIMES DAILY
COMMUNITY
Start: 2024-11-06

## 2024-11-20 RX ORDER — SENNOSIDES 8.6 MG
650 CAPSULE ORAL EVERY 8 HOURS PRN
COMMUNITY

## 2024-11-20 RX ORDER — PREDNISONE 5 MG/1
5 TABLET ORAL DAILY
COMMUNITY

## 2024-11-20 RX ORDER — MELOXICAM 15 MG/1
15 TABLET ORAL DAILY
COMMUNITY
Start: 2024-08-02

## 2024-11-20 NOTE — PRE-PROCEDURE INSTRUCTIONS
ARRIVE AT THE HOSPITAL ON Tuesday, December 3,2024 at 05:45 AM    Once you enter the hospital lobby, take the elevators to the second floor.  Check-In is at the surgery registration desk.      Continue to take your home medications as you normally do up to and including the night before surgery with the exception of any blood thinning medications.    Please stop any blood thinning medications as directed by your surgeon or prescribing physician. Failure to stop certain medications may interfere with your scheduled surgery.    These may include:  Aspirin, Warfarin (Coumadin), Clopidogrel (Plavix), Ibuprofen (Motrin, Advil), Naproxen (Aleve), Meloxicam (Mobic), Celecoxib (Celebrex), Eliquis, Pradaxa, Xarelto, Effient, Fish Oil, Herbal supplements.     No meloxicam,aspirin,aleve or ibuprofen 7 days before surgery    Please take the following medication(s) the day of surgery with a small sip of water:  Prednisone        PREPARING FOR YOUR SURGERY:     Before surgery, you can play an important role in your own health. Because skin is not sterile, we need to be sure that your skin is as free of germs as possible before surgery by carefully washing before surgery.  Preparing or “prepping” skin before surgery can reduce the risk of a “surgical site infection.”  Do not shave the area of your body where your surgery will be performed unless you received specific permission from your physician.    You will need to shower at home the night before surgery and the morning of surgery with a special soap called chlorhexidine gluconate (CHG*).     *Not to be used by people allergic to Chlorhexidine Gluconate (CHG).    Following these instructions will help you be sure that your skin is clean before surgery.    Instructions on cleaning your skin before surgery:     The night before your surgery:     You will need to shower with warm water (not hot) and the CHG soap.      Use a clean wash cloth and a clean towel.  Have clean clothes  the day of surgery.  No nail polish on the operative extremity (arm/leg surgeries)    Please wear loose, comfortable clothing.  If you are potentially going to have a cast or brace bring clothing that will fit over them.                                                                                                          In case of illness - If you have cold or flu like symptoms (high fever, runny nose, sore throat, cough, etc.) rash, nausea, vomiting, loose stools, and/or recent contact with someone who has a contagious disease (chicken pox, measles, etc.) Please call your doctor before coming to the hospital.         Day of Surgery/Procedure:    As a patient at [facility] you can expect quality medical and nursing care that is centered on your individual needs.  Our goal is to make your surgical experience as comfortable as possible    .  Transportation After Your Surgery/Procedure:    You will need a friend or family member to drive you home after your procedure.  Your  must be 18 years of age or older and able to sign off on your discharge instructions.  A taxi cab or any other form of public transportation is not acceptable.  Your friend or family member must stay at the hospital throughout your procedure.    Someone must remain with you for the first 24 hours after your surgery if you receive anesthesia or medication.  If you do not have someone to stay with you, your procedure may be cancelled.      If you have any other questions regarding your procedure or the day of surgery, please call [phone]      _________________________  ____________________________  Signature (Patient)              Signature (Provider) & date

## 2024-11-20 NOTE — H&P
History and Physical Service   [facility]    HISTORY AND PHYSICAL EXAMINATION            Date of Evaluation: 11/20/2024  Patient name:  Elena Romano  MRN:   [id number]  YOB: 1955  PCP:    Ailyn Alas APRN - CNP    History Obtained From:     Patient, medical records    History of Present Illness:     This is Elena Romano a 69 y.o. female who presents for a pre-admission testing appointment for an upcoming RIGHT FOOT ARTHRODESIS OSTEOTOMY RIGHT 2ND  HAMMERTOE CORRECTION RIGHT 2-4 by Deion East DPM scheduled on 12/03/2024 at 0730 due to Hallux valgus (acquired), right foot; Other hammer toe(s) (acquired), righ*. The patient's chief complaint is right foot pain that has progressively worsened over many years. 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  Foot pain is aggravated by prolonged standing, walking, or wearing compressive shoes and is minimally relieved with rest and avoiding compressive shoes. No previous treatments. She does have associated skin irritation and calloused areas over the bunion and hammertoe deformities. Denies recent falls and injuries.     Hypertensive in PAT today, initial /86, repeat /86. Patient asymptomatic denying visual changes, dizziness, headaches, chest pain or SOB. States she takes her bisoprolol-hydrochlorothiazide 5-6.25 mg at  bedtime nightly with last dose last night. Patient instructed to take BP medication and recheck at home as well as call primary care to inform them of readings.     Functional Capacity per pt:  1) Pt is able to walk 2 city blocks on level ground without SOB.  2) Pt is able to climb 2 flights of stairs without SOB.  3) Pt is able to walk up a hill for 1-2 city blocks without SOB.    Past Medical History:     Past Medical History:   Diagnosis Date    Asymptomatic menopausal state     Cervical  no carotid bruits noted.  Lungs: Bilateral equal air entry, clear to auscultation, no wheezing, rales or rhonchi, and normal effort.  Cardiovascular: Normal rate, regular rhythm, no murmur, gallop, or rub.  Abdomen: Obese. Soft, nontender, nondistended, and active bowel sounds.  Neurologic: Normal speech and cranial nerves II through XII grossly intact. Strength 5/5 bilaterally.  Skin: No gross lesions, rashes, bruising, or bleeding on exposed skin area.  Extremities: Trace BLE non-pitting edema Posterior tibial pulses 2+ bilaterally. No calf tenderness with palpation.  Psych: Normal affect.     Investigations:      Laboratory Testing:  Recent Results (from the past 24 hour(s))   EKG 12 Lead    Collection Time: 24  1:42 PM   Result Value Ref Range    Ventricular Rate 64 BPM    Atrial Rate 64 BPM    P-R Interval 176 ms    QRS Duration 78 ms    Q-T Interval 416 ms    QTc Calculation (Bazett) 429 ms    P Axis 42 degrees    R Axis 128 degrees    T Axis 59 degrees       No results for input(s): \"HGB\", \"HCT\", \"WBC\", \"MCV\", \"PLATELET\", \"NA\", \"K\", \"CL\", \"CO2\", \"BUN\", \"CREATININE\", \"GLUCOSE\", \"INR\", \"PROTIME\", \"APTT\", \"AST\", \"ALT\", \"LABALBU\", \"HCG\" in the last 720 hours.    No results for input(s): \"COVID19\" in the last 720 hours.    *Please note that labs listed above are the most recent lab values available in EPIC at the time of the visit and additional labs may have been drawn or resulted since that time.    Imaging/Diagnostics:    No results found.    [id number]- See Chart.    Diagnosis:      1. Hallux valgus (acquired), right foot; Other hammer toe(s) (acquired), righ*    Plans:     1. RIGHT FOOT ARTHRODESIS OSTEOTOMY RIGHT 2ND  HAMMERTOE CORRECTION RIGHT 2-4      Nathaly Mg, GIL - CNP  2024  2:14 PM

## 2024-11-21 LAB
ANION GAP SERPL CALCULATED.3IONS-SCNC: 16 MMOL/L (ref 9–16)
BUN SERPL-MCNC: 18 MG/DL (ref 8–23)
CALCIUM SERPL-MCNC: 9.8 MG/DL (ref 8.6–10.4)
CHLORIDE SERPL-SCNC: 99 MMOL/L (ref 98–107)
CO2 SERPL-SCNC: 25 MMOL/L (ref 20–31)
CREAT SERPL-MCNC: 0.6 MG/DL (ref 0.6–0.9)
ERYTHROCYTE [DISTWIDTH] IN BLOOD BY AUTOMATED COUNT: 12.6 % (ref 11.8–14.4)
GFR, ESTIMATED: >90 ML/MIN/1.73M2
GLUCOSE SERPL-MCNC: 95 MG/DL (ref 74–99)
HCT VFR BLD AUTO: 43.5 % (ref 36.3–47.1)
HGB BLD-MCNC: 14.4 G/DL (ref 11.9–15.1)
MCH RBC QN AUTO: 32.6 PG (ref 25.2–33.5)
MCHC RBC AUTO-ENTMCNC: 33.1 G/DL (ref 28.4–34.8)
MCV RBC AUTO: 98.4 FL (ref 82.6–102.9)
NRBC BLD-RTO: 0 PER 100 WBC
PLATELET # BLD AUTO: 232 K/UL (ref 138–453)
PMV BLD AUTO: 10.5 FL (ref 8.1–13.5)
POTASSIUM SERPL-SCNC: 4.3 MMOL/L (ref 3.7–5.3)
RBC # BLD AUTO: 4.42 M/UL (ref 3.95–5.11)
SODIUM SERPL-SCNC: 140 MMOL/L (ref 136–145)
WBC OTHER # BLD: 10.4 K/UL (ref 3.5–11.3)

## 2024-11-22 LAB
EKG ATRIAL RATE: 64 BPM
EKG P AXIS: 42 DEGREES
EKG P-R INTERVAL: 176 MS
EKG Q-T INTERVAL: 416 MS
EKG QRS DURATION: 78 MS
EKG QTC CALCULATION (BAZETT): 429 MS
EKG R AXIS: 128 DEGREES
EKG T AXIS: 59 DEGREES
EKG VENTRICULAR RATE: 64 BPM

## 2024-12-03 ENCOUNTER — ANESTHESIA (OUTPATIENT)
Dept: OPERATING ROOM | Age: 69
End: 2024-12-03
Payer: MEDICARE

## 2024-12-03 ENCOUNTER — HOSPITAL ENCOUNTER (OUTPATIENT)
Age: 69
Setting detail: OUTPATIENT SURGERY
Discharge: HOME | End: 2024-12-03
Attending: PODIATRIST | Admitting: PODIATRIST
Payer: MEDICARE

## 2024-12-03 ENCOUNTER — ANESTHESIA EVENT (OUTPATIENT)
Dept: OPERATING ROOM | Age: 69
End: 2024-12-03
Payer: MEDICARE

## 2024-12-03 ENCOUNTER — APPOINTMENT (OUTPATIENT)
Dept: GENERAL RADIOLOGY | Age: 69
End: 2024-12-03
Attending: PODIATRIST
Payer: MEDICARE

## 2024-12-03 VITALS
TEMPERATURE: 97.9 F | WEIGHT: 201 LBS | HEIGHT: 64 IN | OXYGEN SATURATION: 95 % | HEART RATE: 71 BPM | RESPIRATION RATE: 16 BRPM | DIASTOLIC BLOOD PRESSURE: 59 MMHG | SYSTOLIC BLOOD PRESSURE: 124 MMHG | BODY MASS INDEX: 34.31 KG/M2

## 2024-12-03 PROCEDURE — 6360000002 HC RX W HCPCS: Performed by: PODIATRIST

## 2024-12-03 PROCEDURE — 3700000000 HC ANESTHESIA ATTENDED CARE: Performed by: PODIATRIST

## 2024-12-03 PROCEDURE — 97161 PT EVAL LOW COMPLEX 20 MIN: CPT

## 2024-12-03 PROCEDURE — 2500000003 HC RX 250 WO HCPCS: Performed by: NURSE ANESTHETIST, CERTIFIED REGISTERED

## 2024-12-03 PROCEDURE — 2580000003 HC RX 258: Performed by: ANESTHESIOLOGY

## 2024-12-03 PROCEDURE — 6360000002 HC RX W HCPCS: Performed by: NURSE ANESTHETIST, CERTIFIED REGISTERED

## 2024-12-03 PROCEDURE — 7100000001 HC PACU RECOVERY - ADDTL 15 MIN: Performed by: PODIATRIST

## 2024-12-03 PROCEDURE — 6360000002 HC RX W HCPCS: Performed by: ANESTHESIOLOGY

## 2024-12-03 PROCEDURE — C1713 ANCHOR/SCREW BN/BN,TIS/BN: HCPCS | Performed by: PODIATRIST

## 2024-12-03 PROCEDURE — 3700000001 HC ADD 15 MINUTES (ANESTHESIA): Performed by: PODIATRIST

## 2024-12-03 PROCEDURE — 97116 GAIT TRAINING THERAPY: CPT

## 2024-12-03 PROCEDURE — 3600000002 HC SURGERY LEVEL 2 BASE: Performed by: PODIATRIST

## 2024-12-03 PROCEDURE — 6360000002 HC RX W HCPCS

## 2024-12-03 PROCEDURE — 2709999900 HC NON-CHARGEABLE SUPPLY: Performed by: PODIATRIST

## 2024-12-03 PROCEDURE — 7100000000 HC PACU RECOVERY - FIRST 15 MIN: Performed by: PODIATRIST

## 2024-12-03 PROCEDURE — 2720000010 HC SURG SUPPLY STERILE: Performed by: PODIATRIST

## 2024-12-03 PROCEDURE — 3600000012 HC SURGERY LEVEL 2 ADDTL 15MIN: Performed by: PODIATRIST

## 2024-12-03 PROCEDURE — 73630 X-RAY EXAM OF FOOT: CPT

## 2024-12-03 PROCEDURE — 7100000010 HC PHASE II RECOVERY - FIRST 15 MIN: Performed by: PODIATRIST

## 2024-12-03 PROCEDURE — 7100000011 HC PHASE II RECOVERY - ADDTL 15 MIN: Performed by: PODIATRIST

## 2024-12-03 DEVICE — 2.7 X 16 MM R3CON LOCKING PLATE SCREW
Type: IMPLANTABLE DEVICE | Site: FOOT | Status: FUNCTIONAL
Brand: GORILLA PLATING SYSTEM

## 2024-12-03 DEVICE — 2.7 X 18 MM R3CON LOCKING PLATE SCREW
Type: IMPLANTABLE DEVICE | Site: FOOT | Status: FUNCTIONAL
Brand: GORILLA PLATING SYSTEM

## 2024-12-03 DEVICE — MINI MONSTER HEADED, SHORT THREAD, 3.5 X 24MM
Type: IMPLANTABLE DEVICE | Site: FOOT | Status: FUNCTIONAL
Brand: MONSTER SCREW SYSTEM

## 2024-12-03 DEVICE — 2.7 X 15 MM R3CON LOCKING PLATE SCREW
Type: IMPLANTABLE DEVICE | Site: FOOT | Status: FUNCTIONAL
Brand: GORILLA PLATING SYSTEM

## 2024-12-03 DEVICE — MTP, 5 DEGREE, SHORT PLATE, RIGHT
Type: IMPLANTABLE DEVICE | Site: FOOT | Status: FUNCTIONAL
Brand: GORILLA PLATING SYSTEM

## 2024-12-03 DEVICE — IMPLANTABLE DEVICE
Type: IMPLANTABLE DEVICE | Status: FUNCTIONAL
Brand: MICROAIRE®

## 2024-12-03 DEVICE — 2.7 X 12 MM R3CON LOCKING PLATE SCREW
Type: IMPLANTABLE DEVICE | Site: FOOT | Status: FUNCTIONAL
Brand: GORILLA PLATING SYSTEM

## 2024-12-03 RX ORDER — HALOPERIDOL 5 MG/ML
1 INJECTION INTRAMUSCULAR
Status: DISCONTINUED | OUTPATIENT
Start: 2024-12-03 | End: 2024-12-03 | Stop reason: HOSPADM

## 2024-12-03 RX ORDER — ONDANSETRON 4 MG/1
4 TABLET, ORALLY DISINTEGRATING ORAL 3 TIMES DAILY PRN
Qty: 21 TABLET | Refills: 0 | Status: SHIPPED | OUTPATIENT
Start: 2024-12-03 | End: 2024-12-03

## 2024-12-03 RX ORDER — LIDOCAINE HYDROCHLORIDE 20 MG/ML
INJECTION, SOLUTION EPIDURAL; INFILTRATION; INTRACAUDAL; PERINEURAL
Status: DISCONTINUED | OUTPATIENT
Start: 2024-12-03 | End: 2024-12-03 | Stop reason: SDUPTHER

## 2024-12-03 RX ORDER — LIDOCAINE HYDROCHLORIDE 10 MG/ML
1 INJECTION, SOLUTION EPIDURAL; INFILTRATION; INTRACAUDAL; PERINEURAL
Status: DISCONTINUED | OUTPATIENT
Start: 2024-12-04 | End: 2024-12-03 | Stop reason: HOSPADM

## 2024-12-03 RX ORDER — NALOXONE HYDROCHLORIDE 0.4 MG/ML
INJECTION, SOLUTION INTRAMUSCULAR; INTRAVENOUS; SUBCUTANEOUS PRN
Status: DISCONTINUED | OUTPATIENT
Start: 2024-12-03 | End: 2024-12-03 | Stop reason: HOSPADM

## 2024-12-03 RX ORDER — SODIUM CHLORIDE 0.9 % (FLUSH) 0.9 %
5-40 SYRINGE (ML) INJECTION PRN
Status: DISCONTINUED | OUTPATIENT
Start: 2024-12-03 | End: 2024-12-03 | Stop reason: HOSPADM

## 2024-12-03 RX ORDER — DEXAMETHASONE SODIUM PHOSPHATE 4 MG/ML
INJECTION, SOLUTION INTRA-ARTICULAR; INTRALESIONAL; INTRAMUSCULAR; INTRAVENOUS; SOFT TISSUE PRN
Status: DISCONTINUED | OUTPATIENT
Start: 2024-12-03 | End: 2024-12-03 | Stop reason: ALTCHOICE

## 2024-12-03 RX ORDER — ROCURONIUM BROMIDE 10 MG/ML
INJECTION, SOLUTION INTRAVENOUS
Status: DISCONTINUED | OUTPATIENT
Start: 2024-12-03 | End: 2024-12-03 | Stop reason: SDUPTHER

## 2024-12-03 RX ORDER — ONDANSETRON 4 MG/1
4 TABLET, ORALLY DISINTEGRATING ORAL 3 TIMES DAILY PRN
Qty: 21 TABLET | Refills: 0 | Status: SHIPPED | OUTPATIENT
Start: 2024-12-03

## 2024-12-03 RX ORDER — EPHEDRINE SULFATE/0.9% NACL/PF 25 MG/5 ML
SYRINGE (ML) INTRAVENOUS
Status: DISCONTINUED | OUTPATIENT
Start: 2024-12-03 | End: 2024-12-03 | Stop reason: SDUPTHER

## 2024-12-03 RX ORDER — BUPIVACAINE HYDROCHLORIDE 5 MG/ML
INJECTION, SOLUTION EPIDURAL; INTRACAUDAL PRN
Status: DISCONTINUED | OUTPATIENT
Start: 2024-12-03 | End: 2024-12-03 | Stop reason: ALTCHOICE

## 2024-12-03 RX ORDER — OXYCODONE HYDROCHLORIDE 5 MG/1
5 TABLET ORAL
Status: DISCONTINUED | OUTPATIENT
Start: 2024-12-03 | End: 2024-12-03 | Stop reason: HOSPADM

## 2024-12-03 RX ORDER — SODIUM CHLORIDE 0.9 % (FLUSH) 0.9 %
5-40 SYRINGE (ML) INJECTION EVERY 12 HOURS SCHEDULED
Status: DISCONTINUED | OUTPATIENT
Start: 2024-12-03 | End: 2024-12-03 | Stop reason: HOSPADM

## 2024-12-03 RX ORDER — FENTANYL CITRATE 50 UG/ML
25 INJECTION, SOLUTION INTRAMUSCULAR; INTRAVENOUS EVERY 5 MIN PRN
Status: DISCONTINUED | OUTPATIENT
Start: 2024-12-03 | End: 2024-12-03 | Stop reason: HOSPADM

## 2024-12-03 RX ORDER — SODIUM CHLORIDE, SODIUM LACTATE, POTASSIUM CHLORIDE, CALCIUM CHLORIDE 600; 310; 30; 20 MG/100ML; MG/100ML; MG/100ML; MG/100ML
INJECTION, SOLUTION INTRAVENOUS CONTINUOUS
Status: DISCONTINUED | OUTPATIENT
Start: 2024-12-03 | End: 2024-12-03 | Stop reason: HOSPADM

## 2024-12-03 RX ORDER — ONDANSETRON 2 MG/ML
INJECTION INTRAMUSCULAR; INTRAVENOUS
Status: DISCONTINUED | OUTPATIENT
Start: 2024-12-03 | End: 2024-12-03 | Stop reason: SDUPTHER

## 2024-12-03 RX ORDER — FENTANYL CITRATE 50 UG/ML
INJECTION, SOLUTION INTRAMUSCULAR; INTRAVENOUS
Status: DISCONTINUED | OUTPATIENT
Start: 2024-12-03 | End: 2024-12-03 | Stop reason: SDUPTHER

## 2024-12-03 RX ORDER — CEFAZOLIN SODIUM/WATER 2 G/20 ML
2000 SYRINGE (ML) INTRAVENOUS ONCE
Status: COMPLETED | OUTPATIENT
Start: 2024-12-03 | End: 2024-12-03

## 2024-12-03 RX ORDER — SODIUM CHLORIDE 9 MG/ML
INJECTION, SOLUTION INTRAVENOUS PRN
Status: DISCONTINUED | OUTPATIENT
Start: 2024-12-03 | End: 2024-12-03 | Stop reason: HOSPADM

## 2024-12-03 RX ORDER — METOCLOPRAMIDE HYDROCHLORIDE 5 MG/ML
10 INJECTION INTRAMUSCULAR; INTRAVENOUS
Status: COMPLETED | OUTPATIENT
Start: 2024-12-03 | End: 2024-12-03

## 2024-12-03 RX ORDER — PROPOFOL 10 MG/ML
INJECTION, EMULSION INTRAVENOUS
Status: DISCONTINUED | OUTPATIENT
Start: 2024-12-03 | End: 2024-12-03 | Stop reason: SDUPTHER

## 2024-12-03 RX ORDER — CEFAZOLIN SODIUM/WATER 2 G/20 ML
2000 SYRINGE (ML) INTRAVENOUS EVERY 8 HOURS
Status: DISCONTINUED | OUTPATIENT
Start: 2024-12-03 | End: 2024-12-03 | Stop reason: HOSPADM

## 2024-12-03 RX ORDER — SODIUM CHLORIDE 9 MG/ML
INJECTION, SOLUTION INTRAVENOUS CONTINUOUS
Status: DISCONTINUED | OUTPATIENT
Start: 2024-12-03 | End: 2024-12-03 | Stop reason: HOSPADM

## 2024-12-03 RX ORDER — HYDROMORPHONE HYDROCHLORIDE 1 MG/ML
0.5 INJECTION, SOLUTION INTRAMUSCULAR; INTRAVENOUS; SUBCUTANEOUS EVERY 5 MIN PRN
Status: DISCONTINUED | OUTPATIENT
Start: 2024-12-03 | End: 2024-12-03 | Stop reason: HOSPADM

## 2024-12-03 RX ORDER — DEXAMETHASONE SODIUM PHOSPHATE 10 MG/ML
INJECTION, SOLUTION INTRAMUSCULAR; INTRAVENOUS
Status: DISCONTINUED | OUTPATIENT
Start: 2024-12-03 | End: 2024-12-03 | Stop reason: SDUPTHER

## 2024-12-03 RX ADMIN — ROCURONIUM BROMIDE 50 MG: 10 INJECTION, SOLUTION INTRAVENOUS at 07:39

## 2024-12-03 RX ADMIN — EPHEDRINE SULFATE 5 MG: 5 INJECTION INTRAVENOUS at 08:24

## 2024-12-03 RX ADMIN — EPHEDRINE SULFATE 10 MG: 5 INJECTION INTRAVENOUS at 08:33

## 2024-12-03 RX ADMIN — EPHEDRINE SULFATE 10 MG: 5 INJECTION INTRAVENOUS at 08:48

## 2024-12-03 RX ADMIN — FENTANYL CITRATE 50 MCG: 50 INJECTION INTRAMUSCULAR; INTRAVENOUS at 07:39

## 2024-12-03 RX ADMIN — FENTANYL CITRATE 25 MCG: 50 INJECTION INTRAMUSCULAR; INTRAVENOUS at 10:34

## 2024-12-03 RX ADMIN — METOCLOPRAMIDE 10 MG: 5 INJECTION, SOLUTION INTRAMUSCULAR; INTRAVENOUS at 11:36

## 2024-12-03 RX ADMIN — FENTANYL CITRATE 50 MCG: 50 INJECTION INTRAMUSCULAR; INTRAVENOUS at 08:01

## 2024-12-03 RX ADMIN — FENTANYL CITRATE 25 MCG: 50 INJECTION INTRAMUSCULAR; INTRAVENOUS at 09:33

## 2024-12-03 RX ADMIN — DEXAMETHASONE SODIUM PHOSPHATE 4 MG: 10 INJECTION INTRAMUSCULAR; INTRAVENOUS at 07:50

## 2024-12-03 RX ADMIN — FENTANYL CITRATE 25 MCG: 50 INJECTION INTRAMUSCULAR; INTRAVENOUS at 09:25

## 2024-12-03 RX ADMIN — EPHEDRINE SULFATE 5 MG: 5 INJECTION INTRAVENOUS at 09:42

## 2024-12-03 RX ADMIN — SODIUM CHLORIDE, POTASSIUM CHLORIDE, SODIUM LACTATE AND CALCIUM CHLORIDE: 600; 310; 30; 20 INJECTION, SOLUTION INTRAVENOUS at 06:37

## 2024-12-03 RX ADMIN — ONDANSETRON 4 MG: 2 INJECTION, SOLUTION INTRAMUSCULAR; INTRAVENOUS at 10:13

## 2024-12-03 RX ADMIN — PROPOFOL 150 MG: 10 INJECTION, EMULSION INTRAVENOUS at 07:39

## 2024-12-03 RX ADMIN — Medication 2000 MG: at 12:06

## 2024-12-03 RX ADMIN — Medication 2000 MG: at 07:45

## 2024-12-03 RX ADMIN — LIDOCAINE HYDROCHLORIDE 80 MG: 20 INJECTION, SOLUTION EPIDURAL; INFILTRATION; INTRACAUDAL; PERINEURAL at 07:39

## 2024-12-03 RX ADMIN — FENTANYL CITRATE 25 MCG: 50 INJECTION INTRAMUSCULAR; INTRAVENOUS at 10:30

## 2024-12-03 ASSESSMENT — LIFESTYLE VARIABLES: SMOKING_STATUS: 0

## 2024-12-03 ASSESSMENT — PAIN - FUNCTIONAL ASSESSMENT: PAIN_FUNCTIONAL_ASSESSMENT: 0-10

## 2024-12-03 NOTE — H&P
HPI  NEUROLOGICAL: Negative for headaches, dizziness, lightheadedness, numbness, and tingling extremities.  BEHAVIOR/PSYCH: Anxiety- Ativan PRN Negative for depression    Physical Exam:   BP (!) 205/86 Comment: takes BP med at bedtime  Pulse 80   Temp 98.2 °F (36.8 °C) (Oral)   Resp 14   Ht 1.626 m (5' 4\")   Wt 91.2 kg (201 lb)   SpO2 99%   BMI 34.50 kg/m²   No LMP recorded. Patient is postmenopausal.    No results for input(s): \"POCGLU\" in the last 72 hours.    General Appearance:  Alert, well appearing, and in no acute distress.  Mental status: Oriented to person, place, and time.  Head: Normocephalic and atraumatic.  Eye: Wearing contacts No icterus, redness, pupils equal and reactive, extraocular eye movements intact, and conjunctiva clear.  Ear: Hearing grossly intact.  Nose:  No drainage noted.  Mouth: Mucous membranes moist.  Neck: Supple and no carotid bruits noted.  Lungs: Bilateral equal air entry, clear to auscultation, no wheezing, rales or rhonchi, and normal effort.  Cardiovascular: Normal rate, regular rhythm, no murmur, gallop, or rub.  Abdomen: Obese. Soft, nontender, nondistended, and active bowel sounds.  Neurologic: Normal speech and cranial nerves II through XII grossly intact. Strength 5/5 bilaterally.  Skin: No gross lesions, rashes, bruising, or bleeding on exposed skin area.  Extremities: Trace BLE non-pitting edema Posterior tibial pulses 2+ bilaterally. No calf tenderness with palpation.  Psych: Normal affect.     Investigations:      Laboratory Testing:  Recent Results (from the past 24 hour(s))   EKG 12 Lead    Collection Time: 24  1:42 PM   Result Value Ref Range    Ventricular Rate 64 BPM    Atrial Rate 64 BPM    P-R Interval 176 ms    QRS Duration 78 ms    Q-T Interval 416 ms    QTc Calculation (Bazett) 429 ms    P Axis 42 degrees    R Axis 128 degrees    T Axis 59 degrees       No results for input(s): \"HGB\", \"HCT\", \"WBC\", \"MCV\", \"PLATELET\", \"NA\", \"K\", \"CL\",

## 2024-12-03 NOTE — BRIEF OP NOTE
IMPL KWIRE 062 - [id number] Screw/Plate/Nail/Shai IMPL KWIRE 062  [employer]  Right 1 Implanted         Drains: * No LDAs found *    Injectables: 20 cc of 1: 1 mix of 2% lidocaine plain with half percent Marcaine given preoperatively as an ankle block.  10 cc of 10: 4 mix of half percent Marcaine and dexamethasone given postoperatively    Findings:  Infection Present At Time Of Surgery (PATOS) (choose all levels that have infection present):  No infection present  Other Findings: First metatarsophalangeal joint fusion, arthrodesis of the second, third, fourth toes of the right foot.    Electronically signed by Jeri Montero DPM on 12/3/2024 at 10:43 AM

## 2024-12-03 NOTE — DISCHARGE INSTRUCTIONS
Foot and Ankle Surgery Post Operative Instructions:  You have had a surgical procedure on your foot/ ankle      Fluids and Diet:  Begin with clear liquids, broth, dry toast, and crackers.  If not nauseated then resume your regular pre-operative diet when you are ready    Medications:  Take your prescriptions as directed  You are receiving new prescriptions for Percocet and Zofran; take as directed.  You received nerve block to the foot-this should manage your pain for the first 18hrs post operatively  If your pain is not severe then you may take the non-prescription medication that you normally take for aches and pains ie Tylenol and Ibuprofen (alternating), or if severe pain occurs these will serve as additional medication   You may resume your regularly scheduled medications (unless otherwise directed)  If you have a fracture or a surgery that involves placing hardware (screws, plates, joint replacement), avoid the routine use of NSAIDs for about 6 months if possible (due to a risk of delayed bone healing).  If any side effects or adverse reactions occur, discontinue the medication and contact your doctor.  Review the patient drug information that is provided before you take any medication    Ambulation and Activity:  You are advised to go directly home from the hospital  Use assistance device with either crutches, walker, or knee scooter  We recommend knee scooter if possible, you can also obtain these on [employer] for rather affordable and quickly obtainable.  You may not put weight on the operated foot.  You should wear the surgical shoe at all times when awake.  Please do not put any weight to the ball of the right foot.  Avoid stairs.  Do not lift or move heavy objects  Do not drive until cleared by your physician    Bandage and Wound Care Instructions:  Keep bandage clean and dry  Do NOT remove dressing/ splint  DO NOT get wet  Please use shower cover around leg if you do shower so that dressing does not

## 2024-12-03 NOTE — PROGRESS NOTES
length  Distance: 75 ft x 1 w/ knee scooter; 45 ft w/ r.walker  Stairs/Curb  Stairs?: Yes  Stairs  # Steps :  (attempted steps w/ 2 rails and w/ one rail and one person max assist - > pt unable to lift good leg to step -> pt reports she is too woozy to attempt anymore at time time.  reports she will either bump up on bottom or he will lift her w/ his son)     Gait training provided. Pt education on proper sizing of device with visual demonstration of proper use. Gait instruction provided including tactile cueing and verbal cueing for sequencing and weight shifting. Worked on NWB Rt LE and normalized gait pattern.      Balance  Posture: Good  Sitting - Static: Good  Sitting - Dynamic: Good  Standing - Static: Good  Standing - Dynamic: Fair;+ (w/ device)     AM-PAC - Mobility 17/24    Goals  Patient Goals   Patient Goals : Pt goal is for safe d/c home. Pt reports she does have knee scooter and w/c at home for safe use.     Education  Patient Education  Education Given To: Patient;Family  Education Provided: Role of Therapy  Education Provided Comments: crutch, knee scooter, and r.walker training for gait NWB  Education Method: Verbal;Demonstration  Education Outcome: Verbalized understanding;Demonstrated understanding    Therapy Time   Individual   Time In 1311   Time Out 1345   Minutes 34       Total patient care time 44 minutes. Treatment time 25 minutes.       MARCELLA LYNNE, PT

## 2024-12-03 NOTE — ANESTHESIA POSTPROCEDURE EVALUATION
Department of Anesthesiology  Postprocedure Note    Patient: Elena Romano  MRN: [id number]  YOB: 1955  Date of evaluation: 12/3/2024    Procedure Summary       Date: 12/03/24 Room / Location: [address]    Anesthesia Start: 0733 Anesthesia Stop: 1050    Procedure: RIGHT FOOT ARTHRODESIS OSTEOTOMY RIGHT 2ND  HAMMERTOE CORRECTION RIGHT 2-4 (Right: Foot) Diagnosis:       Hallux valgus, right      Acquired hammertoe of right foot      (Hallux valgus, right [M20.11])      (Acquired hammertoe of right foot [M20.41])    Surgeons: Deion East DPM Responsible Provider: Lalo Villegas MD    Anesthesia Type: general ASA Status: 2            Anesthesia Type: No value filed.    Jane Phase I: Jane Score: 10    Jane Phase II: Jane Score: 10    Anesthesia Post Evaluation    Patient location during evaluation: PACU  Patient participation: complete - patient participated  Level of consciousness: awake  Airway patency: patent  Nausea & Vomiting: no nausea  Cardiovascular status: blood pressure returned to baseline  Respiratory status: acceptable  Hydration status: euvolemic  Comments: Multimodal analgesia pain management as indicated by procedure  Multimodal analgesia pain management approach  Pain management: adequate    No notable events documented.

## 2024-12-03 NOTE — ANESTHESIA PRE PROCEDURE
arthritis:..                 Abdominal:             Vascular:          Other Findings:             Anesthesia Plan      general     ASA 2       Induction: intravenous.    MIPS: Postoperative opioids intended and Prophylactic antiemetics administered.  Anesthetic plan and risks discussed with patient.                        ANUPAMA GONZÁLES MD   12/3/2024

## 2024-12-04 NOTE — OP NOTE
then repaired using 2-0 vicryl.    A layered closure was performed with 4-0 Vicryl, and 4-0 nylon sutures.   Dressings were applied.  The patient was then awakened by the Anesthesia  Department and returned to the recovery room with vital signs stable.   She appeared to tolerate the anesthesia and the procedure well.    Electronically signed by Jeri Montero DPM on 12/3/2024 at 8:14 PM

## 2025-01-29 ENCOUNTER — HOSPITAL ENCOUNTER (OUTPATIENT)
Dept: MAMMOGRAPHY | Age: 70
Discharge: HOME | End: 2025-01-31
Payer: MEDICARE

## 2025-01-29 DIAGNOSIS — Z12.31 ENCOUNTER FOR SCREENING MAMMOGRAM FOR BREAST CANCER: ICD-10-CM

## 2025-01-29 PROCEDURE — 77063 BREAST TOMOSYNTHESIS BI: CPT

## (undated) DEVICE — SOLUTION IRRIG 500ML 0.9% SOD CHLO USP POUR PLAS BTL

## (undated) DEVICE — SUTURE VICRYL + SZ 4-0 L27IN ABSRB UD PS-2 3/8 CIR REV CUT VCP426H

## (undated) DEVICE — PRECISION THIN (5.5 X 0.38 X 18.0MM)

## (undated) DEVICE — GOWN,SIRUS,NONRNF,SETINSLV,XL,20/CS: Brand: MEDLINE

## (undated) DEVICE — OLIVE WIRE, SMOOTH, 1.4MM: Brand: BABY GORILLA/GORILLA PLATING SYSTEM

## (undated) DEVICE — 3.0MM ROUND SOLID CARBIDE BUR MEDIUM

## (undated) DEVICE — K-WIRE, SINGLE ENDED TROCAR TIP, SMOOTH, 1.2 X 150MM
Type: IMPLANTABLE DEVICE | Site: FOOT | Status: NON-FUNCTIONAL
Brand: MONSTER SCREW SYSTEM
Removed: 2024-12-03

## (undated) DEVICE — BLADE,CARBON-STEEL,15,STRL,DISPOSABLE,TB: Brand: MEDLINE

## (undated) DEVICE — STOCKINETTE ORTH UNIV W3INXL25YD COT W DISPNS BX PROTOUCH

## (undated) DEVICE — DRILL,  2.3 X 120MM, CANNULATED, AO: Brand: MONSTER SCREW SYSTEM

## (undated) DEVICE — DRILL, 2.0 X 110MM, SOLID, MEASURING, AO: Brand: BABY GORILLA®/GORILLA® PLATING SYSTEM

## (undated) DEVICE — STAZ LOWER EXTREMITY: Brand: MEDLINE INDUSTRIES, INC.

## (undated) DEVICE — CAP PROTCT YEL REFIL GEN FOR 0.45IN WIRE W SER PIN BALL

## (undated) DEVICE — DRAPE,REIN 53X77,STERILE: Brand: MEDLINE

## (undated) DEVICE — PRECISION THIN (9.0 X 0.38 X 25.0MM)

## (undated) DEVICE — 1.6MM K-WIRE, 450MM, BLUNT TIP
Type: IMPLANTABLE DEVICE | Status: NON-FUNCTIONAL
Removed: 2024-12-03

## (undated) DEVICE — YANKAUER,FLEXIBLE HANDLE,REGLR CAPACITY: Brand: MEDLINE INDUSTRIES, INC.

## (undated) DEVICE — P28, K-WIRE, 1.6 X 150 MM, SINGLE TROCAR, SMOOTH, SS
Type: IMPLANTABLE DEVICE | Site: FOOT | Status: NON-FUNCTIONAL
Brand: MULTI SYSTEM
Removed: 2024-12-03

## (undated) DEVICE — COUNTERSINK, 3.5 HEADED: Brand: MONSTER® SCREW SYSTEM

## (undated) DEVICE — SUTURE N ABSRB L 18 IN SZ 4-0 NDL L 19 MM NYL MONOFILAMENT

## (undated) DEVICE — BNDG,ELSTC,MATRIX,STRL,4"X5YD,LF,HOOK&LP: Brand: MEDLINE

## (undated) DEVICE — GLOVE ORTHO 8   MSG9480

## (undated) DEVICE — CAP PROTCT GRN REFIL FOR 0.054-0.062IN WIRE W SER PIN BALL

## (undated) DEVICE — SMALL TEAR CROSS CUT RASP (11.0 X 5.0MM)

## (undated) DEVICE — ZIMMER® STERILE DISPOSABLE TOURNIQUET CUFF WITH PLC, DUAL PORT, SINGLE BLADDER, 30 IN. (76 CM)

## (undated) DEVICE — BONE FENESTRATION PERFORATOR: Brand: BABY GORILLA®/GORILLA® PLATING SYSTEM

## (undated) DEVICE — SUTURE VICRYL + ABSORBABLE BRAIDED 2-0 FS1 27 IN WHT  VCP443H

## (undated) DEVICE — SKIN PREP TRAY W/CHG: Brand: MEDLINE INDUSTRIES, INC.

## (undated) DEVICE — BANDAGE COMPR W6INXL10YD ST M E WHITE/BEIGE

## (undated) DEVICE — MASTISOL ADHESIVE LIQ 2/3ML

## (undated) DEVICE — SPLINT QUICK STEP SCOTCHCAST 5 X 30